# Patient Record
Sex: FEMALE | Race: WHITE | NOT HISPANIC OR LATINO | Employment: UNEMPLOYED | URBAN - METROPOLITAN AREA
[De-identification: names, ages, dates, MRNs, and addresses within clinical notes are randomized per-mention and may not be internally consistent; named-entity substitution may affect disease eponyms.]

---

## 2019-11-05 LAB
ABS NEUT (OLG): 6.3 X10(3)/MCL (ref 2.1–9.2)
BASOPHILS # BLD AUTO: 0 X10(3)/MCL (ref 0–0.2)
BASOPHILS NFR BLD AUTO: 1 %
BUN SERPL-MCNC: 13 MG/DL (ref 7–18)
CALCIUM SERPL-MCNC: 9.1 MG/DL (ref 8.5–10.1)
CHLORIDE SERPL-SCNC: 107 MMOL/L (ref 98–107)
CO2 SERPL-SCNC: 28 MMOL/L (ref 21–32)
CREAT SERPL-MCNC: 0.72 MG/DL (ref 0.55–1.02)
CREAT/UREA NIT SERPL: 18.1
EOSINOPHIL # BLD AUTO: 0 X10(3)/MCL (ref 0–0.9)
EOSINOPHIL NFR BLD AUTO: 0 %
ERYTHROCYTE [DISTWIDTH] IN BLOOD BY AUTOMATED COUNT: 13.9 % (ref 11.5–17)
GLUCOSE SERPL-MCNC: 78 MG/DL (ref 74–106)
GROUP & RH: NORMAL
HCT VFR BLD AUTO: 41.6 % (ref 37–47)
HGB BLD-MCNC: 12.9 GM/DL (ref 12–16)
LYMPHOCYTES # BLD AUTO: 1.9 X10(3)/MCL (ref 0.6–4.6)
LYMPHOCYTES NFR BLD AUTO: 21 %
MCH RBC QN AUTO: 26.7 PG (ref 27–31)
MCHC RBC AUTO-ENTMCNC: 31 GM/DL (ref 33–36)
MCV RBC AUTO: 86.1 FL (ref 80–94)
MONOCYTES # BLD AUTO: 0.5 X10(3)/MCL (ref 0.1–1.3)
MONOCYTES NFR BLD AUTO: 6 %
NEUTROPHILS # BLD AUTO: 6.3 X10(3)/MCL (ref 2.1–9.2)
NEUTROPHILS NFR BLD AUTO: 71 %
PLATELET # BLD AUTO: 292 X10(3)/MCL (ref 130–400)
PMV BLD AUTO: 11 FL (ref 9.4–12.4)
POTASSIUM SERPL-SCNC: 4.5 MMOL/L (ref 3.5–5.1)
RBC # BLD AUTO: 4.83 X10(6)/MCL (ref 4.2–5.4)
SODIUM SERPL-SCNC: 139 MMOL/L (ref 136–145)
WBC # SPEC AUTO: 8.8 X10(3)/MCL (ref 4.5–11.5)

## 2019-11-11 ENCOUNTER — HISTORICAL (OUTPATIENT)
Dept: SURGERY | Facility: HOSPITAL | Age: 24
End: 2019-11-11

## 2022-04-03 ENCOUNTER — HISTORICAL (OUTPATIENT)
Dept: ADMINISTRATIVE | Facility: HOSPITAL | Age: 27
End: 2022-04-03

## 2022-07-18 ENCOUNTER — CLINICAL SUPPORT (OUTPATIENT)
Dept: OTHER | Facility: CLINIC | Age: 27
End: 2022-07-18
Payer: MEDICAID

## 2022-07-18 DIAGNOSIS — Z00.8 ENCOUNTER FOR OTHER GENERAL EXAMINATION: ICD-10-CM

## 2022-07-19 VITALS
SYSTOLIC BLOOD PRESSURE: 138 MMHG | HEIGHT: 62 IN | DIASTOLIC BLOOD PRESSURE: 75 MMHG | BODY MASS INDEX: 39.01 KG/M2 | WEIGHT: 212 LBS

## 2022-07-19 LAB
HDLC SERPL-MCNC: 28 MG/DL
POC CHOLESTEROL, LDL (DOCK): 111.78 MG/DL
POC CHOLESTEROL, TOTAL: 151 MG/DL
POC GLUCOSE, FASTING: 81 MG/DL (ref 60–110)
TRIGL SERPL-MCNC: 54 MG/DL

## 2022-11-29 NOTE — OP NOTE
DATE OF SURGERY:    11/11/2019    SURGEON:  Cachorro Valencia MD    PROCEDURE:  Laparoscopic tubal cauterization, intrauterine device removal.    ANESTHESIA:  General.    PREOPERATIVE DIAGNOSIS:  Undesired fertility, desires intrauterine device removal.    POSTOPERATIVE DIAGNOSIS:  Undesired fertility, desires intrauterine device removal.    PROCEDURE IN DETAIL:  After appropriate consents were signed and verified, the patient was taken to the operating room.  She was placed on the OR table in supine position.  General anesthesia was administered and maintained throughout the procedure.  No antibiotics were given.  She was converted to a dorsal lithotomy and Trendelenburg position.  The perineum, vagina, and cervix were prepped as was the abdomen and she was draped in the usual sterile manner.  The bladder was drained of clear urine.  A weighted speculum was placed in the posterior vagina.  A single-tooth tenaculum was placed on the anterior cervix.  IUD strings were noted protruding from the cervix.  These strings were grasped with a ring forceps and a Mirena IUD was removed intact without difficulty.       Next, an acorn cannula was inserted in the cervix and attached to the tenaculum to help manipulate the uterus.  A 2 cm curvilinear incision was made in the skin with a scalpel inferior to the umbilicus.  Through this incision a Veress needle was placed.  Entry into the abdominal cavity was confirmed with a negative hanging drop test.  2.5 L of CO2 gas were instilled into the abdominal cavity.  The Veress needle was removed.  A 10-12 mm trocar and sleeve was inserted through the incision, the sleeve left in place, the trocar removed, and an operative laparoscope was introduced.  An adhesion was noted between omentum and the anterior abdominal wall in the midline near the incision, but there was no evidence of injury to underlying blood vessels, organs, or tissues.  A [employer] forceps was then introduced  through the operative laparoscope and the right fallopian tube was cauterized in a 1 cm contiguous space near the junction of the proximal and middle 1/3.  Good obliteration of normal tubal architecture was noted.  This procedure was duplicated for the left tube.  There was no evidence of injury to surrounding blood vessels, organs, or tissues.  The remainder of the pelvis was unremarkable.       The procedure was concluded.  The instrument was removed from the trocar sleeve.  The CO2 gas was allowed to escape through the sleeve and the sleeve was removed.  The skin was closed with 4-0 Vicryl in a running subcuticular stitch and a pressure bandage was applied.  Instruments were removed from the vagina and minimal bleeding was noted.  Estimated blood loss was less than 5 cc.  Sponge, needle, and     instrument counts were correct.  The patient was reversed of anesthesia and taken to recovery awake and in stable condition.        ______________________________  MD KYLER Amin/RAJESH  DD:  11/11/2019  Time:  07:42AM  DT:  11/11/2019  Time:  07:57AM  Job #:  [id number]      details…

## 2023-04-19 ENCOUNTER — LAB VISIT (OUTPATIENT)
Dept: LAB | Facility: HOSPITAL | Age: 28
End: 2023-04-19
Attending: OBSTETRICS & GYNECOLOGY
Payer: MEDICAID

## 2023-04-19 DIAGNOSIS — Z34.80 PRENATAL CARE, SUBSEQUENT PREGNANCY: Primary | ICD-10-CM

## 2023-04-19 LAB — B-HCG FREE SERPL-ACNC: ABNORMAL MIU/ML

## 2023-04-19 PROCEDURE — 84702 CHORIONIC GONADOTROPIN TEST: CPT

## 2023-04-19 PROCEDURE — 36415 COLL VENOUS BLD VENIPUNCTURE: CPT

## 2023-04-24 ENCOUNTER — LAB VISIT (OUTPATIENT)
Dept: LAB | Facility: HOSPITAL | Age: 28
End: 2023-04-24
Attending: OBSTETRICS & GYNECOLOGY
Payer: MEDICAID

## 2023-04-24 DIAGNOSIS — Z34.00 SUPERVISION OF NORMAL FIRST PREGNANCY: Primary | ICD-10-CM

## 2023-04-24 LAB — B-HCG FREE SERPL-ACNC: ABNORMAL MIU/ML

## 2023-04-24 PROCEDURE — 84702 CHORIONIC GONADOTROPIN TEST: CPT

## 2023-04-24 PROCEDURE — 36415 COLL VENOUS BLD VENIPUNCTURE: CPT

## 2024-05-08 LAB
PAP RECOMMENDATION EXT: NORMAL
PAP SMEAR: NORMAL

## 2024-12-23 ENCOUNTER — LAB VISIT (OUTPATIENT)
Dept: LAB | Facility: HOSPITAL | Age: 29
End: 2024-12-23
Attending: FAMILY MEDICINE
Payer: MEDICAID

## 2024-12-23 ENCOUNTER — OFFICE VISIT (OUTPATIENT)
Dept: FAMILY MEDICINE | Facility: CLINIC | Age: 29
End: 2024-12-23
Payer: MEDICAID

## 2024-12-23 VITALS
WEIGHT: 211.19 LBS | HEART RATE: 79 BPM | HEIGHT: 62 IN | TEMPERATURE: 97 F | OXYGEN SATURATION: 99 % | RESPIRATION RATE: 18 BRPM | SYSTOLIC BLOOD PRESSURE: 108 MMHG | BODY MASS INDEX: 38.86 KG/M2 | DIASTOLIC BLOOD PRESSURE: 64 MMHG

## 2024-12-23 DIAGNOSIS — R42 DIZZINESS: ICD-10-CM

## 2024-12-23 DIAGNOSIS — E61.1 IRON DEFICIENCY: Primary | ICD-10-CM

## 2024-12-23 DIAGNOSIS — F41.1 GAD (GENERALIZED ANXIETY DISORDER): ICD-10-CM

## 2024-12-23 DIAGNOSIS — E61.1 IRON DEFICIENCY: ICD-10-CM

## 2024-12-23 LAB
ALBUMIN SERPL-MCNC: 4 G/DL (ref 3.5–5)
ALBUMIN/GLOB SERPL: 1.4 RATIO (ref 1.1–2)
ALP SERPL-CCNC: 82 UNIT/L (ref 40–150)
ALT SERPL-CCNC: 11 UNIT/L (ref 0–55)
ANION GAP SERPL CALC-SCNC: 9 MEQ/L
AST SERPL-CCNC: 12 UNIT/L (ref 5–34)
BASOPHILS # BLD AUTO: 0.06 X10(3)/MCL
BASOPHILS NFR BLD AUTO: 0.9 %
BILIRUB SERPL-MCNC: 0.4 MG/DL
BUN SERPL-MCNC: 14 MG/DL (ref 7–18.7)
CALCIUM SERPL-MCNC: 9.1 MG/DL (ref 8.4–10.2)
CHLORIDE SERPL-SCNC: 109 MMOL/L (ref 98–107)
CO2 SERPL-SCNC: 24 MMOL/L (ref 22–29)
CREAT SERPL-MCNC: 0.71 MG/DL (ref 0.55–1.02)
CREAT/UREA NIT SERPL: 20
EOSINOPHIL # BLD AUTO: 0.07 X10(3)/MCL (ref 0–0.9)
EOSINOPHIL NFR BLD AUTO: 1 %
ERYTHROCYTE [DISTWIDTH] IN BLOOD BY AUTOMATED COUNT: 13.2 % (ref 11.5–17)
FERRITIN SERPL-MCNC: 26.88 NG/ML (ref 4.63–204)
GFR SERPLBLD CREATININE-BSD FMLA CKD-EPI: >60 ML/MIN/1.73/M2
GLOBULIN SER-MCNC: 2.9 GM/DL (ref 2.4–3.5)
GLUCOSE SERPL-MCNC: 81 MG/DL (ref 74–100)
HCT VFR BLD AUTO: 39.8 % (ref 37–47)
HGB BLD-MCNC: 12.6 G/DL (ref 12–16)
IMM GRANULOCYTES # BLD AUTO: 0.01 X10(3)/MCL (ref 0–0.04)
IMM GRANULOCYTES NFR BLD AUTO: 0.1 %
IRON SATN MFR SERPL: 14 % (ref 20–50)
IRON SERPL-MCNC: 48 UG/DL (ref 50–170)
LYMPHOCYTES # BLD AUTO: 2.05 X10(3)/MCL (ref 0.6–4.6)
LYMPHOCYTES NFR BLD AUTO: 30 %
MCH RBC QN AUTO: 27.5 PG (ref 27–31)
MCHC RBC AUTO-ENTMCNC: 31.7 G/DL (ref 33–36)
MCV RBC AUTO: 86.9 FL (ref 80–94)
MONOCYTES # BLD AUTO: 0.48 X10(3)/MCL (ref 0.1–1.3)
MONOCYTES NFR BLD AUTO: 7 %
NEUTROPHILS # BLD AUTO: 4.17 X10(3)/MCL (ref 2.1–9.2)
NEUTROPHILS NFR BLD AUTO: 61 %
NRBC BLD AUTO-RTO: 0 %
PLATELET # BLD AUTO: 304 X10(3)/MCL (ref 130–400)
PMV BLD AUTO: 10.7 FL (ref 7.4–10.4)
POTASSIUM SERPL-SCNC: 3.9 MMOL/L (ref 3.5–5.1)
PROT SERPL-MCNC: 6.9 GM/DL (ref 6.4–8.3)
RBC # BLD AUTO: 4.58 X10(6)/MCL (ref 4.2–5.4)
RET# (OHS): 0.08 X10E6/UL (ref 0.02–0.08)
RETICULOCYTE COUNT AUTOMATED (OLG): 1.71 % (ref 1.1–2.1)
SODIUM SERPL-SCNC: 142 MMOL/L (ref 136–145)
TIBC SERPL-MCNC: 301 UG/DL (ref 70–310)
TIBC SERPL-MCNC: 349 UG/DL (ref 250–450)
TRANSFERRIN SERPL-MCNC: 319 MG/DL (ref 180–382)
TSH SERPL-ACNC: 0.96 UIU/ML (ref 0.35–4.94)
WBC # BLD AUTO: 6.84 X10(3)/MCL (ref 4.5–11.5)

## 2024-12-23 PROCEDURE — 85045 AUTOMATED RETICULOCYTE COUNT: CPT

## 2024-12-23 PROCEDURE — 36415 COLL VENOUS BLD VENIPUNCTURE: CPT

## 2024-12-23 PROCEDURE — 85025 COMPLETE CBC W/AUTO DIFF WBC: CPT

## 2024-12-23 PROCEDURE — 80053 COMPREHEN METABOLIC PANEL: CPT

## 2024-12-23 PROCEDURE — 84443 ASSAY THYROID STIM HORMONE: CPT

## 2024-12-23 PROCEDURE — 82728 ASSAY OF FERRITIN: CPT

## 2024-12-23 PROCEDURE — 83550 IRON BINDING TEST: CPT

## 2024-12-23 RX ORDER — SERTRALINE HYDROCHLORIDE 25 MG/1
25 TABLET, FILM COATED ORAL DAILY
Qty: 30 TABLET | Refills: 11 | Status: SHIPPED | OUTPATIENT
Start: 2024-12-23 | End: 2025-12-23

## 2024-12-23 RX ORDER — HYDROXYZINE HYDROCHLORIDE 25 MG/1
25 TABLET, FILM COATED ORAL 4 TIMES DAILY PRN
Qty: 120 TABLET | Refills: 0 | Status: SHIPPED | OUTPATIENT
Start: 2024-12-23 | End: 2025-01-22

## 2024-12-23 NOTE — PROGRESS NOTES
"   Patient ID: [id number]     Chief Complaint: Establish Care, Dizziness (" Always had issues with Iron/vit D. But I take OTC" ), and Depression ("Worse since having baby 8 months ago"  On no meds for depression. )    HPI:     Steffanie Agee is a 29 y.o. female here today for Establish Care, Dizziness (" Always had issues with Iron/vit D. But I take OTC" ), and Depression ("Worse since having baby 8 months ago"  On no meds for depression. )    No past medical history on file.     Past Surgical History:   Procedure Laterality Date    ADENOIDECTOMY      BREAST SURGERY      Cyst removed in right breast     SECTION  18    TONSILLECTOMY      TUBAL LIGATION  2019    Had it untied in        Review of patient's allergies indicates:   Allergen Reactions    Ultram [tramadol] Hallucinations       No outpatient medications have been marked as taking for the 24 encounter (Office Visit) with Jensen Platt DO.       Social History     Socioeconomic History    Marital status:    Tobacco Use    Smoking status: Never    Smokeless tobacco: Never   Substance and Sexual Activity    Alcohol use: Never    Drug use: Never    Sexual activity: Yes     Partners: Female     Birth control/protection: None     Comment: 1 partner for 11 years     Social Drivers of Health     Financial Resource Strain: Low Risk  (2024)    Overall Financial Resource Strain (CARDIA)     Difficulty of Paying Living Expenses: Not very hard   Food Insecurity: Food Insecurity Present (2024)    Hunger Vital Sign     Worried About Running Out of Food in the Last Year: Sometimes true     Ran Out of Food in the Last Year: Sometimes true   Transportation Needs: No Transportation Needs (3/20/2024)    Received from [facility], [facility]    PRAPARE - Transportation     Lack of " "Transportation (Medical): No     Lack of Transportation (Non-Medical): No   Physical Activity: Insufficiently Active (12/16/2024)    Exercise Vital Sign     Days of Exercise per Week: 3 days     Minutes of Exercise per Session: 20 min   Stress: Stress Concern Present (12/16/2024)    [employer] [place] [employer] - Occupational Stress Questionnaire     Feeling of Stress : Very much   Housing Stability: High Risk (12/16/2024)    Housing Stability Vital Sign     Unable to Pay for Housing in the Last Year: Yes        Family History   Problem Relation Name Age of Onset    Alcohol abuse Mother Lisa montgomery     Depression Mother Lisa montgomery     Drug abuse Mother Lisa montgomery     Early death Mother Lisa montgomery     Alcohol abuse Father Brayan Agee     Drug abuse Father Brayan Agee     Early death Father Brayan Agee     Arthritis Maternal Grandmother Alysa     Cancer Maternal Grandmother Alysa     Depression Maternal Grandmother Alysa     Diabetes Maternal Grandmother Alysa     Cancer Paternal Grandmother Geni Agee     Depression Paternal Grandmother Geni Agee     Birth defects Son Tiosomy 13     Early death Son Tiosomy 13         Patient Care Team:  Jensen Platt DO as PCP - General  Jocelyn Cordoba MD (Obstetrics and Gynecology)     Subjective:     Review of Systems   Constitutional:  Negative for chills and fever.   Respiratory:  Negative for shortness of breath.    Cardiovascular:  Negative for chest pain.   Gastrointestinal:  Negative for constipation and diarrhea.   Neurological:  Positive for dizziness. Negative for headaches.   Psychiatric/Behavioral:  Positive for depression. The patient is nervous/anxious. The patient does not have insomnia.        See HPI for details  All Other ROS: Negative except as stated in HPI.       Objective:     /64 (BP Location: Left arm, Patient Position: Sitting)   Pulse 79   Temp 97.1 °F (36.2 °C)   Resp 18   Ht 5' 2" (1.575 " m)   Wt 95.8 kg (211 lb 3.2 oz)   LMP 12/17/2024 (Exact Date)   SpO2 99%   BMI 38.63 kg/m²     Physical Exam  Vitals reviewed.   Constitutional:       General: She is not in acute distress.     Appearance: Normal appearance. She is obese.   Cardiovascular:      Rate and Rhythm: Normal rate and regular rhythm.      Heart sounds: No murmur heard.     No friction rub. No gallop.   Pulmonary:      Effort: No respiratory distress.      Breath sounds: No wheezing, rhonchi or rales.   Musculoskeletal:         General: No swelling, tenderness or deformity.      Right lower leg: No edema.      Left lower leg: No edema.   Skin:     General: Skin is warm and dry.      Findings: No lesion or rash.   Neurological:      General: No focal deficit present.      Mental Status: She is alert.   Psychiatric:         Mood and Affect: Mood normal.         Assessment/Plan:     1. Iron deficiency  -     Iron and TIBC; Future; Expected date: 12/23/2024  -     Ferritin; Future; Expected date: 12/23/2024  -     Reticulocytes; Future; Expected date: 12/23/2024  -     CBC Auto Differential; Future; Expected date: 12/23/2024  -     Comprehensive Metabolic Panel; Future; Expected date: 12/23/2024    2. Dizziness  -     Comprehensive Metabolic Panel; Future; Expected date: 12/23/2024    3. Postpartum depression  -     sertraline (ZOLOFT) 25 MG tablet; Take 1 tablet (25 mg total) by mouth once daily.  Dispense: 30 tablet; Refill: 11    4. CAPRI (generalized anxiety disorder)  -     sertraline (ZOLOFT) 25 MG tablet; Take 1 tablet (25 mg total) by mouth once daily.  Dispense: 30 tablet; Refill: 11  -     hydrOXYzine HCL (ATARAX) 25 MG tablet; Take 1 tablet (25 mg total) by mouth 4 (four) times daily as needed for Anxiety.  Dispense: 120 tablet; Refill: 0       Follow up:     Follow up in about 6 weeks (around 2/3/2025) for Follow up anxiety and depression with Dr. TONA Carreon In addition to their scheduled follow up, the patient has also been instructed to  follow up on as needed basis.

## 2024-12-30 ENCOUNTER — TELEPHONE (OUTPATIENT)
Dept: FAMILY MEDICINE | Facility: CLINIC | Age: 29
End: 2024-12-30
Payer: MEDICAID

## 2024-12-30 NOTE — TELEPHONE ENCOUNTER
----- Message from Jeyson Rodriguez NP sent at 12/30/2024  2:39 PM CST -----  Iron level is low. Is she taking an iron supplement?     All other labs within normal ranges.     Has upcoming appt.

## 2025-01-27 ENCOUNTER — OFFICE VISIT (OUTPATIENT)
Dept: FAMILY MEDICINE | Facility: CLINIC | Age: 30
End: 2025-01-27
Payer: MEDICAID

## 2025-01-27 VITALS
HEIGHT: 62 IN | BODY MASS INDEX: 38.09 KG/M2 | DIASTOLIC BLOOD PRESSURE: 68 MMHG | HEART RATE: 63 BPM | SYSTOLIC BLOOD PRESSURE: 110 MMHG | WEIGHT: 207 LBS | OXYGEN SATURATION: 98 % | TEMPERATURE: 97 F | RESPIRATION RATE: 16 BRPM

## 2025-01-27 DIAGNOSIS — Z00.00 ROUTINE GENERAL MEDICAL EXAMINATION AT A HEALTH CARE FACILITY: ICD-10-CM

## 2025-01-27 DIAGNOSIS — F41.1 GAD (GENERALIZED ANXIETY DISORDER): Primary | ICD-10-CM

## 2025-01-27 DIAGNOSIS — E61.1 IRON DEFICIENCY: ICD-10-CM

## 2025-01-27 PROCEDURE — 3074F SYST BP LT 130 MM HG: CPT | Mod: CPTII,,, | Performed by: FAMILY MEDICINE

## 2025-01-27 PROCEDURE — 1160F RVW MEDS BY RX/DR IN RCRD: CPT | Mod: CPTII,,, | Performed by: FAMILY MEDICINE

## 2025-01-27 PROCEDURE — 1159F MED LIST DOCD IN RCRD: CPT | Mod: CPTII,,, | Performed by: FAMILY MEDICINE

## 2025-01-27 PROCEDURE — 3078F DIAST BP <80 MM HG: CPT | Mod: CPTII,,, | Performed by: FAMILY MEDICINE

## 2025-01-27 PROCEDURE — 99213 OFFICE O/P EST LOW 20 MIN: CPT | Mod: ,,, | Performed by: FAMILY MEDICINE

## 2025-01-27 PROCEDURE — 3008F BODY MASS INDEX DOCD: CPT | Mod: CPTII,,, | Performed by: FAMILY MEDICINE

## 2025-01-27 NOTE — PROGRESS NOTES
Patient ID: [id number]     Chief Complaint: Follow-up, Depression (Gotten better ), and Anxiety (Feeling better. Has noticed a big difference. )    HPI:     Steffanie Agee is a 29 y.o. female here today for Follow-up, Depression (Gotten better ), and Anxiety (Feeling better. Has noticed a big difference. ).       No past medical history on file.     Past Surgical History:   Procedure Laterality Date    ADENOIDECTOMY      BREAST SURGERY      Cyst removed in right breast     SECTION  18    TONSILLECTOMY      TUBAL LIGATION  2019    Had it untied in        Review of patient's allergies indicates:   Allergen Reactions    Ultram [tramadol] Hallucinations       Outpatient Medications Marked as Taking for the 25 encounter (Office Visit) with Jensen Platt, DO   Medication Sig Dispense Refill    sertraline (ZOLOFT) 25 MG tablet Take 1 tablet (25 mg total) by mouth once daily. 30 tablet 11       Social History     Socioeconomic History    Marital status:    Tobacco Use    Smoking status: Never    Smokeless tobacco: Never   Substance and Sexual Activity    Alcohol use: Never    Drug use: Never    Sexual activity: Yes     Partners: Female     Birth control/protection: None     Comment: 1 partner for 11 years     Social Drivers of Health     Financial Resource Strain: Low Risk  (2024)    Overall Financial Resource Strain (CARDIA)     Difficulty of Paying Living Expenses: Not very hard   Food Insecurity: Food Insecurity Present (2024)    Hunger Vital Sign     Worried About Running Out of Food in the Last Year: Sometimes true     Ran Out of Food in the Last Year: Sometimes true   Transportation Needs: No Transportation Needs (3/20/2024)    Received from [facility], [facility]    PRAPARE - Transportation     Lack of Transportation  "(Medical): No     Lack of Transportation (Non-Medical): No   Physical Activity: Insufficiently Active (12/16/2024)    Exercise Vital Sign     Days of Exercise per Week: 3 days     Minutes of Exercise per Session: 20 min   Stress: Stress Concern Present (12/16/2024)    [employer] [place] [employer] - Occupational Stress Questionnaire     Feeling of Stress : Very much   Housing Stability: High Risk (12/16/2024)    Housing Stability Vital Sign     Unable to Pay for Housing in the Last Year: Yes        Family History   Problem Relation Name Age of Onset    Alcohol abuse Mother Lisaowen montgomery     Depression Mother Lisa montgomery     Drug abuse Mother Lisa montgomery     Early death Mother Lisa montgomery     Alcohol abuse Father rBayan Agee     Drug abuse Father Brayan Agee     Early death Father Brayan Agee     Arthritis Maternal Grandmother Alysa     Cancer Maternal Grandmother Alysa     Depression Maternal Grandmother Alysa     Diabetes Maternal Grandmother Alysa     Cancer Paternal Grandmother Geni Agee     Depression Paternal Grandmother Geni Agee     Birth defects Son Tiosomy 13     Early death Son Tiosomy 13         Patient Care Team:  Jensen Platt DO as PCP - General  Jocelyn Cordoba MD (Obstetrics and Gynecology)     Subjective:     ROS    See HPI for details  All Other ROS: Negative except as stated in HPI.       Objective:     /68 (BP Location: Left arm, Patient Position: Sitting)   Pulse 63   Temp 96.9 °F (36.1 °C) (Temporal)   Resp 16   Ht 5' 2" (1.575 m)   Wt 93.9 kg (207 lb)   LMP 01/20/2025 (Exact Date)   SpO2 98%   BMI 37.86 kg/m²     Physical Exam  Vitals reviewed.   Constitutional:       General: She is not in acute distress.     Appearance: Normal appearance. She is obese.   Cardiovascular:      Rate and Rhythm: Normal rate and regular rhythm.      Heart sounds: No murmur heard.     No friction rub. No gallop.   Pulmonary:      Effort: No " respiratory distress.      Breath sounds: No wheezing, rhonchi or rales.   Musculoskeletal:         General: No swelling, tenderness or deformity.      Right lower leg: No edema.      Left lower leg: No edema.   Skin:     General: Skin is warm and dry.      Findings: No lesion or rash.   Neurological:      General: No focal deficit present.      Mental Status: She is alert.   Psychiatric:         Mood and Affect: Mood normal.         Assessment/Plan:     1. CAPRI (generalized anxiety disorder)  -continue zoloft 25mg daily  2. Postpartum depression  -continue zoloft 25mg daily      Follow up:     Follow up in about 6 months (around 7/27/2025) for Wellness with labs. In addition to their scheduled follow up, the patient has also been instructed to follow up on as needed basis.

## 2025-03-05 ENCOUNTER — PATIENT MESSAGE (OUTPATIENT)
Dept: FAMILY MEDICINE | Facility: CLINIC | Age: 30
End: 2025-03-05

## 2025-03-05 ENCOUNTER — LAB VISIT (OUTPATIENT)
Dept: LAB | Facility: HOSPITAL | Age: 30
End: 2025-03-05
Payer: MEDICAID

## 2025-03-05 ENCOUNTER — OFFICE VISIT (OUTPATIENT)
Dept: FAMILY MEDICINE | Facility: CLINIC | Age: 30
End: 2025-03-05
Payer: MEDICAID

## 2025-03-05 VITALS
RESPIRATION RATE: 20 BRPM | BODY MASS INDEX: 37.51 KG/M2 | HEIGHT: 62 IN | SYSTOLIC BLOOD PRESSURE: 121 MMHG | DIASTOLIC BLOOD PRESSURE: 81 MMHG | WEIGHT: 203.81 LBS | TEMPERATURE: 98 F | OXYGEN SATURATION: 98 % | HEART RATE: 82 BPM

## 2025-03-05 DIAGNOSIS — F41.1 GAD (GENERALIZED ANXIETY DISORDER): ICD-10-CM

## 2025-03-05 DIAGNOSIS — R23.3 SPONTANEOUS ECCHYMOSES: ICD-10-CM

## 2025-03-05 DIAGNOSIS — F32.4 MAJOR DEPRESSIVE DISORDER WITH SINGLE EPISODE, IN PARTIAL REMISSION: ICD-10-CM

## 2025-03-05 DIAGNOSIS — R23.3 SPONTANEOUS ECCHYMOSES: Primary | ICD-10-CM

## 2025-03-05 DIAGNOSIS — M79.10 MYALGIA: ICD-10-CM

## 2025-03-05 LAB
ALBUMIN SERPL-MCNC: 4.1 G/DL (ref 3.5–5)
ALBUMIN/GLOB SERPL: 1.3 RATIO (ref 1.1–2)
ALP SERPL-CCNC: 69 UNIT/L (ref 40–150)
ALT SERPL-CCNC: 12 UNIT/L (ref 0–55)
ANION GAP SERPL CALC-SCNC: 6 MEQ/L
APTT PPP: <23.2 SECONDS (ref 24.5–32.8)
AST SERPL-CCNC: 11 UNIT/L (ref 5–34)
BASOPHILS # BLD AUTO: 0.08 X10(3)/MCL
BASOPHILS NFR BLD AUTO: 1 %
BILIRUB SERPL-MCNC: 0.4 MG/DL
BUN SERPL-MCNC: 13 MG/DL (ref 7–18.7)
CALCIUM SERPL-MCNC: 9.3 MG/DL (ref 8.4–10.2)
CHLORIDE SERPL-SCNC: 110 MMOL/L (ref 98–107)
CO2 SERPL-SCNC: 27 MMOL/L (ref 22–29)
CREAT SERPL-MCNC: 0.75 MG/DL (ref 0.55–1.02)
CREAT/UREA NIT SERPL: 17
EOSINOPHIL # BLD AUTO: 0.07 X10(3)/MCL (ref 0–0.9)
EOSINOPHIL NFR BLD AUTO: 0.8 %
ERYTHROCYTE [DISTWIDTH] IN BLOOD BY AUTOMATED COUNT: 13.8 % (ref 11.5–17)
GFR SERPLBLD CREATININE-BSD FMLA CKD-EPI: >60 ML/MIN/1.73/M2
GLOBULIN SER-MCNC: 3.2 GM/DL (ref 2.4–3.5)
GLUCOSE SERPL-MCNC: 92 MG/DL (ref 74–100)
HCT VFR BLD AUTO: 40.1 % (ref 37–47)
HGB BLD-MCNC: 13.1 G/DL (ref 12–16)
IMM GRANULOCYTES # BLD AUTO: 0.02 X10(3)/MCL (ref 0–0.04)
IMM GRANULOCYTES NFR BLD AUTO: 0.2 %
INR PPP: 0.9
LYMPHOCYTES # BLD AUTO: 1.91 X10(3)/MCL (ref 0.6–4.6)
LYMPHOCYTES NFR BLD AUTO: 22.7 %
MCH RBC QN AUTO: 28.2 PG (ref 27–31)
MCHC RBC AUTO-ENTMCNC: 32.7 G/DL (ref 33–36)
MCV RBC AUTO: 86.4 FL (ref 80–94)
MONOCYTES # BLD AUTO: 0.55 X10(3)/MCL (ref 0.1–1.3)
MONOCYTES NFR BLD AUTO: 6.5 %
NEUTROPHILS # BLD AUTO: 5.77 X10(3)/MCL (ref 2.1–9.2)
NEUTROPHILS NFR BLD AUTO: 68.8 %
NRBC BLD AUTO-RTO: 0 %
PLATELET # BLD AUTO: 275 X10(3)/MCL (ref 130–400)
PMV BLD AUTO: 10.3 FL (ref 7.4–10.4)
POTASSIUM SERPL-SCNC: 4.2 MMOL/L (ref 3.5–5.1)
PROT SERPL-MCNC: 7.3 GM/DL (ref 6.4–8.3)
PROTHROMBIN TIME: 9.6 SECONDS (ref 9.3–11.4)
RBC # BLD AUTO: 4.64 X10(6)/MCL (ref 4.2–5.4)
SODIUM SERPL-SCNC: 143 MMOL/L (ref 136–145)
WBC # BLD AUTO: 8.4 X10(3)/MCL (ref 4.5–11.5)

## 2025-03-05 PROCEDURE — 1159F MED LIST DOCD IN RCRD: CPT | Mod: CPTII,,,

## 2025-03-05 PROCEDURE — 3074F SYST BP LT 130 MM HG: CPT | Mod: CPTII,,,

## 2025-03-05 PROCEDURE — 80053 COMPREHEN METABOLIC PANEL: CPT

## 2025-03-05 PROCEDURE — 3008F BODY MASS INDEX DOCD: CPT | Mod: CPTII,,,

## 2025-03-05 PROCEDURE — 85025 COMPLETE CBC W/AUTO DIFF WBC: CPT

## 2025-03-05 PROCEDURE — 1160F RVW MEDS BY RX/DR IN RCRD: CPT | Mod: CPTII,,,

## 2025-03-05 PROCEDURE — 3079F DIAST BP 80-89 MM HG: CPT | Mod: CPTII,,,

## 2025-03-05 PROCEDURE — 99214 OFFICE O/P EST MOD 30 MIN: CPT | Mod: ,,,

## 2025-03-05 PROCEDURE — 36415 COLL VENOUS BLD VENIPUNCTURE: CPT

## 2025-03-05 PROCEDURE — 85610 PROTHROMBIN TIME: CPT

## 2025-03-05 PROCEDURE — 85730 THROMBOPLASTIN TIME PARTIAL: CPT

## 2025-03-05 RX ORDER — FERROUS SULFATE 325(65) MG
325 TABLET ORAL
COMMUNITY

## 2025-03-05 NOTE — ASSESSMENT & PLAN NOTE
Evaluated the patient's report of spontaneous bruises on legs present for over 1 week.  Confirmed the patient denies falling or hitting herself on anything.  Planned to check platelet count and coagulation factors.  Ordered CBC (Complete Blood Count), APTT (Activated Partial Thromboplastin Time), PT (Prothrombin Time) and INR (International Normalized Ratio) tests.  Advised the patient to reduce consumption of foods high in vitamin K.  Explained that certain foods elevated in vitamin K (beef, pork, dark green vegetables, soybeans) can affect PT and INR results.  Steffanie to cut back on these foods before labs.  Discussed the importance of avoiding vitamin K supplements prior to labs.  Steffanie to stop taking any supplements containing vitamin K before labs.  Planned to check liver function as part of initial workup    Will consider further testing for specific coagulation disorders (e.g., Von Willebrand disease) if initial results are abnormal

## 2025-03-05 NOTE — ASSESSMENT & PLAN NOTE
Continued Zoloft 25 mg daily for anxiety and depression.    Exercise daily. Get sunlight daily.  Practice positive phrases and repeat throughout the day, along with yoga and relaxation techniques.  Establish good social support, make changes to reduce stress.  Reports any symptoms of suicidal/homicidal ideations or self harm immediately, if clinic is closed go to nearest emergency room.

## 2025-03-05 NOTE — PROGRESS NOTES
"  Patient ID: [id number]     Chief Complaint: Bleeding/Bruising (Bruising easily)      HPI:     Steffanie Agee is a 29 year old female who presents today with unexplained bruising.    PRESENT ILLNESS:  She reports bruising present for over a week, with bruises on the front beginning to fade. The bruises are very tender to touch. She denies any history of trauma, falls, or known precipitating events. She also reports generalized body aches, describing feeling "muscle sore" throughout her body without any changes in routine or new activities.    MENSTRUAL HISTORY:  Last menstrual period was on the 16th of last month. She denies heavy menstrual flow during her most recent cycle.    MEDICATIONS:  She takes Zoloft 25 mg daily for anxiety and depression, iron 325 mg daily, and a prenatal vitamin.    MENTAL HEALTH:  She denies current suicidal or homicidal ideation.    FAMILY HISTORY:  Family history includes fibromyalgia on paternal side, cervical cancer in one grandmother, breast cancer in other grandmother, and maternal cirrhosis secondary to alcoholism. Both parents are . She has a family history of Leukemia and Lupus.        Past Medical History:   Diagnosis Date    Depression         Past Surgical History:   Procedure Laterality Date    ADENOIDECTOMY      BREAST SURGERY      Cyst removed in right breast     SECTION  20    TONSILLECTOMY      TUBAL LIGATION  2019    Had it untied in         Social History     Socioeconomic History    Marital status:    Tobacco Use    Smoking status: Never    Smokeless tobacco: Never   Substance and Sexual Activity    Alcohol use: Never    Drug use: Never    Sexual activity: Yes     Partners: Female     Birth control/protection: None     Comment: 1 partner for 11 years     Social Drivers of Health     Financial Resource Strain: Low Risk  (2024)    Overall Financial Resource Strain (CARDIA)     Difficulty of Paying Living Expenses: Not very hard " "  Food Insecurity: Food Insecurity Present (12/16/2024)    Hunger Vital Sign     Worried About Running Out of Food in the Last Year: Sometimes true     Ran Out of Food in the Last Year: Sometimes true   Transportation Needs: No Transportation Needs (3/20/2024)    Received from [facility] and Its Subsidiaries and Affiliates    PRAPARE - Transportation     Lack of Transportation (Medical): No     Lack of Transportation (Non-Medical): No   Physical Activity: Insufficiently Active (12/16/2024)    Exercise Vital Sign     Days of Exercise per Week: 3 days     Minutes of Exercise per Session: 20 min   Stress: Stress Concern Present (12/16/2024)    [employer] - Occupational Stress Questionnaire     Feeling of Stress : Very much   Housing Stability: High Risk (12/16/2024)    Housing Stability Vital Sign     Unable to Pay for Housing in the Last Year: Yes        Current Outpatient Medications   Medication Instructions    ferrous sulfate (FEOSOL) 325 mg, With breakfast    prenatal vit no.124/iron/folic (PRENATAL VITAMIN ORAL) Take by mouth.    sertraline (ZOLOFT) 25 mg, Oral, Daily       Review of patient's allergies indicates:   Allergen Reactions    Ultram [tramadol] Hallucinations        Patient Care Team:  Jensen Paltt DO as PCP - General  Jocelyn Cordoba MD (Obstetrics and Gynecology)     Subjective:     Review of Systems    12 point review of systems conducted, negative except as stated in the history of present illness. See HPI for details.    Objective:     Visit Vitals  /81 (BP Location: Right arm, Patient Position: Sitting)   Pulse 82   Temp 97.6 °F (36.4 °C)   Resp 20   Ht 5' 2" (1.575 m)   Wt 92.4 kg (203 lb 12.8 oz)   LMP 02/16/2025   SpO2 98%   BMI 37.28 kg/m²       Physical Exam  Vitals and nursing note reviewed.   Constitutional:       General: She is not in acute distress.     Appearance: She is not ill-appearing.   HENT:     "  Head: Normocephalic and atraumatic.      Mouth/Throat:      Mouth: Mucous membranes are moist.      Pharynx: Oropharynx is clear.   Eyes:      General: No scleral icterus.     Extraocular Movements: Extraocular movements intact.      Conjunctiva/sclera: Conjunctivae normal.      Pupils: Pupils are equal, round, and reactive to light.   Neck:      Vascular: No carotid bruit.   Cardiovascular:      Rate and Rhythm: Normal rate and regular rhythm.      Heart sounds: No murmur heard.     No friction rub. No gallop.   Pulmonary:      Effort: Pulmonary effort is normal. No respiratory distress.      Breath sounds: Normal breath sounds. No wheezing, rhonchi or rales.   Abdominal:      General: Abdomen is flat. Bowel sounds are normal. There is no distension.      Palpations: Abdomen is soft. There is no mass.      Tenderness: There is no abdominal tenderness.   Musculoskeletal:         General: Normal range of motion.      Cervical back: Normal range of motion and neck supple.   Skin:     General: Skin is warm and dry.      Findings: Bruising and ecchymosis present.          Neurological:      General: No focal deficit present.      Mental Status: She is alert.   Psychiatric:         Mood and Affect: Mood normal.         Labs Reviewed:     Chemistry:  Lab Results   Component Value Date     03/05/2025    K 4.2 03/05/2025    BUN 13.0 03/05/2025    CREATININE 0.75 03/05/2025    EGFRNORACEVR >60 03/05/2025    GLUCOSE 92 03/05/2025    CALCIUM 9.3 03/05/2025    ALKPHOS 69 03/05/2025    LABPROT 9.6 03/05/2025    LABPROT 7.3 03/05/2025    ALBUMIN 4.1 03/05/2025    BILIDIR 0.4 04/03/2022    IBILI 0.80 04/03/2022    AST 11 03/05/2025    ALT 12 03/05/2025    TSH 0.959 12/23/2024     Assessment:       ICD-10-CM ICD-9-CM   1. Spontaneous ecchymoses  R23.3 782.7   2. Myalgia  M79.10 729.1   3. CAPRI (generalized anxiety disorder)  F41.1 300.02   4. Major depressive disorder with single episode, in partial remission  F32.4 296.25         Plan:     1. Spontaneous ecchymoses  Assessment & Plan:  Evaluated the patient's report of spontaneous bruises on legs present for over 1 week.  Confirmed the patient denies falling or hitting herself on anything.  Planned to check platelet count and coagulation factors.  Ordered CBC (Complete Blood Count), APTT (Activated Partial Thromboplastin Time), PT (Prothrombin Time) and INR (International Normalized Ratio) tests.  Advised the patient to reduce consumption of foods high in vitamin K.  Explained that certain foods elevated in vitamin K (beef, pork, dark green vegetables, soybeans) can affect PT and INR results.  Steffanie to cut back on these foods before labs.  Discussed the importance of avoiding vitamin K supplements prior to labs.  Steffanie to stop taking any supplements containing vitamin K before labs.  Planned to check liver function as part of initial workup    Will consider further testing for specific coagulation disorders (e.g., Von Willebrand disease) if initial results are abnormal    Orders:  -     CBC Auto Differential; Future; Expected date: 03/05/2025  -     APTT; Future; Expected date: 03/05/2025  -     Protime-INR; Future; Expected date: 03/05/2025  -     Comprehensive Metabolic Panel; Future; Expected date: 03/05/2025  -     Cancel: Flow Cytometry Panel; Future; Expected date: 03/06/2025  -     Flow Cytometry Panel; Future; Expected date: 03/06/2025    2. Myalgia  -     Lupus Comprehensive Panel; Future; Expected date: 03/06/2025  -     RHEUMATOID ARTHRITIS PANEL; Future; Expected date: 03/06/2025  -     ANTI-SSA + ANTI-SSB; Future; Expected date: 03/06/2025  -     Anti-Neutrophilic Cytoplasmic Antibody; Future; Expected date: 03/06/2025    3. CAPRI (generalized anxiety disorder)  Assessment & Plan:  Well controlled.   Continue Zoloft 25 mg daily.     Practice deep breathing or abdominal breathing exercises when anxiety occurs.  Exercise daily. Get sunlight daily.  Avoid caffeine, alcohol and  stimulants.  Practice positive phrases and repeat throughout the day, along with yoga and relaxation techniques.  Set healthy boundaries, avoid people and conversations that increase stress.  Reports any symptoms of suicidal or homicidal ideations immediately, if clinic is closed go to nearest emergency room.      4. Major depressive disorder with single episode, in partial remission  Assessment & Plan:  Continued Zoloft 25 mg daily for anxiety and depression.    Exercise daily. Get sunlight daily.  Practice positive phrases and repeat throughout the day, along with yoga and relaxation techniques.  Establish good social support, make changes to reduce stress.  Reports any symptoms of suicidal/homicidal ideations or self harm immediately, if clinic is closed go to nearest emergency room.        FOLLOW UP:  Scheduled follow up in 1 week to discuss lab results.  Instructed the patient to complete labs closer to the follow-up visit date.  Contact the office at any time with concerns.         Follow up in about 1 week (around 3/12/2025) for Virtual Visit with Jeyson . In addition to their scheduled follow up, the patient has also been instructed to follow up on as needed basis.     This note was generated with the assistance of ambient listening technology. Verbal consent was obtained by the patient and accompanying visitor(s) for the recording of patient appointment to facilitate this note. I attest to having reviewed and edited the generated note for accuracy, though some syntax or spelling errors may persist. Please contact the author of this note for any clarification.      Jeyson Rodriguez, Adult-Gerontology NP

## 2025-03-05 NOTE — ASSESSMENT & PLAN NOTE
Well controlled.   Continue Zoloft 25 mg daily.     Practice deep breathing or abdominal breathing exercises when anxiety occurs.  Exercise daily. Get sunlight daily.  Avoid caffeine, alcohol and stimulants.  Practice positive phrases and repeat throughout the day, along with yoga and relaxation techniques.  Set healthy boundaries, avoid people and conversations that increase stress.  Reports any symptoms of suicidal or homicidal ideations immediately, if clinic is closed go to nearest emergency room.

## 2025-03-06 ENCOUNTER — LAB VISIT (OUTPATIENT)
Dept: LAB | Facility: HOSPITAL | Age: 30
End: 2025-03-06
Payer: MEDICAID

## 2025-03-06 DIAGNOSIS — M79.10 MYALGIA: ICD-10-CM

## 2025-03-06 DIAGNOSIS — R23.3 SPONTANEOUS ECCHYMOSES: ICD-10-CM

## 2025-03-06 PROCEDURE — 86036 ANCA SCREEN EACH ANTIBODY: CPT

## 2025-03-06 PROCEDURE — 36415 COLL VENOUS BLD VENIPUNCTURE: CPT

## 2025-03-06 PROCEDURE — 86235 NUCLEAR ANTIGEN ANTIBODY: CPT

## 2025-03-06 PROCEDURE — 86431 RHEUMATOID FACTOR QUANT: CPT | Mod: 59

## 2025-03-06 PROCEDURE — 86160 COMPLEMENT ANTIGEN: CPT

## 2025-03-07 LAB
ENA SS-A IGG SER-ACNC: <0.2 U
ENA SS-B IGG SER-ACNC: 0.9 U

## 2025-03-08 LAB
C-ANCA TITR SER IF: NEGATIVE {TITER}
CYCLIC CITRULLINATED PEPTIDE (CCP) (OHS): 0.7 U/ML
P-ANCA SER QL IF: NEGATIVE
RHEUMATOID FACTOR IGA QUANTITATIVE (OLG): 1.9 IU/ML
RHEUMATOID FACTOR IGM QUANTITATIVE (OLG): 0.7 IU/ML

## 2025-03-09 LAB
C3 SERPL-MCNC: 143 MG/DL
C4 SERPL-MCNC: 33 MG/DL
NUCLEAR IGG SER QL IA: NORMAL
RHEUMATOID FACT SERPL-ACNC: <10 IU/ML

## 2025-03-11 ENCOUNTER — RESULTS FOLLOW-UP (OUTPATIENT)
Dept: FAMILY MEDICINE | Facility: CLINIC | Age: 30
End: 2025-03-11
Payer: MEDICAID

## 2025-03-11 ENCOUNTER — E-CONSULT (OUTPATIENT)
Dept: RHEUMATOLOGY | Facility: CLINIC | Age: 30
End: 2025-03-11
Payer: MEDICAID

## 2025-03-11 DIAGNOSIS — R23.3 SPONTANEOUS ECCHYMOSES: Primary | ICD-10-CM

## 2025-03-11 NOTE — CONSULTS
[facility] [place] - Rheumatology [facility]  Response for E-Consult     Patient Name: Steffanie Agee  MRN: [id number]  Primary Care Provider: Jensen Platt DO   Requesting Provider: Jeyson Rodriguez NP  Consults  What is your clinical question? Positive SS-B Ab. Past Sjogren's SS-B was also positive. All other autoimmune work up seems to be negative. Patient has fatigue, myalgia, spontaneous bruising. has a family hx of leukemia and lupus. What are your further recommendations?       Recommendation: SSB antibody without SSA antibody positivity is not associated with Sjogren's syndrome. Please consult hematology for evaluation of spontaneous bruising. If patient has sicca syndrome, please consult ENT specialist for minor salivary gland biopsy to evaluate for Sjogren's syndrome.     https://pubmed.ncbi.nlm.nih.gov/89611461      Total time of Consultation: 5 minute    I did not speak to the requesting provider verbally about this.     *This eConsult is based on the clinical data available to me and is furnished without benefit of a physical examination. The eConsult will need to be interpreted in light of any clinical issues or changes in patient status not available to me at the time of filing this eConsults. Significant changes in patient condition or level of acuity should result in immediate formal consultation and reevaluation. Please alert me if you have further questions.    Thank you for this eConsult referral.     Jersey Goodwin MD  [facility] [place] - Rheumatology [facility]

## 2025-03-12 ENCOUNTER — OFFICE VISIT (OUTPATIENT)
Dept: FAMILY MEDICINE | Facility: CLINIC | Age: 30
End: 2025-03-12
Payer: MEDICAID

## 2025-03-12 ENCOUNTER — E-CONSULT (OUTPATIENT)
Dept: HEMATOLOGY/ONCOLOGY | Facility: HOSPITAL | Age: 30
End: 2025-03-12
Payer: MEDICAID

## 2025-03-12 VITALS — BODY MASS INDEX: 37.48 KG/M2 | RESPIRATION RATE: 20 BRPM | HEIGHT: 62 IN | WEIGHT: 203.69 LBS

## 2025-03-12 DIAGNOSIS — M79.10 MYALGIA: ICD-10-CM

## 2025-03-12 DIAGNOSIS — R21 BUTTERFLY RASH: ICD-10-CM

## 2025-03-12 DIAGNOSIS — L56.8 PHOTOSENSITIVITY DUE TO SUN: ICD-10-CM

## 2025-03-12 DIAGNOSIS — R23.3 SPONTANEOUS ECCHYMOSES: Primary | ICD-10-CM

## 2025-03-12 NOTE — ASSESSMENT & PLAN NOTE
Steffanie reports symptoms consistent with lupus including spontaneous bruising, muscle soreness, fatigue, joint pain, muscle aches, itchy splotchy rash, oral ulcers, headaches, depression, and facial flushing.    Noted family history of lupus, including mother being tested for lupus, aunt on father's side having it, and both aunts on mother's side having it.    Reviewed previous lab results:     CBC normal  H/H stable,PT INR normal, kidney and liver function normal  APTT abnormal  CCP antibodies negative  rheumatoid arthritis panel negative  ANGI negative  complement component normal  rheumatoid factor <10  Sjogren's antibodies SSB 0.9, SSA <0.2.    Assessed that patient's symptoms are consistent with lupus, especially the butterfly rash.    Ordered additional tests including anti-double stranded DNA antibodies, TSH, Smith antibodies, and protein electrophoresis to further investigate lupus.    Plan to refer patient to Rheumatology and Hematology pending test results.

## 2025-03-12 NOTE — ASSESSMENT & PLAN NOTE
Steffanie reports developing a rash, becoming dizzy and nauseated when exposed to sunlight.  Observed facial flushing in patient's cheeks.

## 2025-03-12 NOTE — PROGRESS NOTES
Patient ID: [id number]     Chief Complaint: Follow-up (1 week follow up for bruising/One on her leg is healing but has more on arms and feet/Tender to touch and purple in color)      HPI:     This is a telemedicine note. Patient was treated using telemedicine, real time audio and video, according to [facility] protocols. Jeyson CARLOS AGNP-C,  conducted the visit from the [facility]. The patient participated in the visit at a non-[facility] location selected by the patient, identified below. I am licensed in the state where the patient stated they are located. The patient stated that they understood and accepted the privacy and security risks to their information at their location.     Patient was located at the patient's home.     Steffanie Agee is a 29 year old female presents today for follow-up on spontaneous bruising and to discuss lab results.    CURRENT SYMPTOMS:  She reports spontaneous bruising without associated trauma or falls, along with unexplained muscle soreness without changes in activity level. She experiences fatigue, joint pain, and muscle aches after showering. She develops an itchy, splotchy rash on her chest and legs immediately post-shower that is self-limiting. She reports frequent mouth ulcers, headaches, and depression. She has photosensitivity manifesting as nausea, dizziness, and rash with prolonged sun exposure.    FAMILY HISTORY:  Family history is significant for multiple autoimmune conditions, particularly lupus affecting both maternal aunts, paternal aunt, and both grandparents. There is extensive cancer history including leukemia in two cousins and a  great uncle, ovarian cancer in maternal grandmother, and breast cancer in paternal grandmother and aunt.    OBSTETRIC HISTORY:  She has a history of three live births, including twin pregnancies, and multiple miscarriages.    COVID-19 EXPOSURE:  She reports household exposure to COVID-19, with her son developing  fever on Thursday night.       Past Medical History:   Diagnosis Date    Depression         Past Surgical History:   Procedure Laterality Date    ADENOIDECTOMY      BREAST SURGERY      Cyst removed in right breast     SECTION  20    TONSILLECTOMY      TUBAL LIGATION  2019    Had it untied in         Social History     Socioeconomic History    Marital status:    Tobacco Use    Smoking status: Never    Smokeless tobacco: Never   Substance and Sexual Activity    Alcohol use: Never    Drug use: Never    Sexual activity: Yes     Partners: Female     Birth control/protection: None     Comment: 1 partner for 11 years     Social Drivers of Health     Financial Resource Strain: Low Risk  (2024)    Overall Financial Resource Strain (CARDIA)     Difficulty of Paying Living Expenses: Not very hard   Food Insecurity: Food Insecurity Present (2024)    Hunger Vital Sign     Worried About Running Out of Food in the Last Year: Sometimes true     Ran Out of Food in the Last Year: Sometimes true   Transportation Needs: No Transportation Needs (3/20/2024)    Received from [facility] Subsidiaries and Affiliates    PRAPARE - Transportation     Lack of Transportation (Medical): No     Lack of Transportation (Non-Medical): No   Physical Activity: Insufficiently Active (2024)    Exercise Vital Sign     Days of Exercise per Week: 3 days     Minutes of Exercise per Session: 20 min   Stress: Stress Concern Present (2024)    [employer] - Occupational Stress Questionnaire     Feeling of Stress : Very much   Housing Stability: High Risk (2024)    Housing Stability Vital Sign     Unable to Pay for Housing in the Last Year: Yes        Current Outpatient Medications   Medication Instructions    ferrous sulfate (FEOSOL) 325 mg, With breakfast    prenatal vit no.124/iron/folic (PRENATAL VITAMIN ORAL) Take by mouth.     "sertraline (ZOLOFT) 25 mg, Oral, Daily       Review of patient's allergies indicates:   Allergen Reactions    Ultram [tramadol] Hallucinations        Patient Care Team:  Jensen Platt DO as PCP - General  Jocelyn Cordoba MD (Obstetrics and Gynecology)     Subjective:     Review of Systems    12 point review of systems conducted, negative except as stated in the history of present illness. See HPI for details.    Objective:     Visit Vitals  Resp 20   Ht 5' 2" (1.575 m)   Wt 92.4 kg (203 lb 11.3 oz)   LMP 02/16/2025   BMI 37.26 kg/m²       Physical Exam    Physical Exam: LIMITED DUE TO TELEMEDICINE RESTRICTIONS.  General: Alert and oriented, No acute distress.  Head: Normocephalic, Atraumatic.  Eye: Sclera non-icteric.  Neck/Thyroid:  Full range of motion.  Respiratory: Non-labored respirations, Symmetrical chest wall expansion.  Musculoskeletal: Normal range of motion.  Integumentary:  No visible suspicious lesions or rashes. No diaphoresis.   Neurologic: No focal deficits  Psychiatric: Normal interaction, Coherent speech, Euthymic mood, Appropriate affect       Assessment:       ICD-10-CM ICD-9-CM   1. Spontaneous ecchymoses  R23.3 782.7   2. Photosensitivity due to sun  L56.8 692.72   3. Butterfly rash  R21 782.1   4. Myalgia  M79.10 729.1        Plan:     1. Spontaneous ecchymoses  Assessment & Plan:  Steffanie reports spontaneous bruising without trauma or falls.  Reviewed previous lab results related to bruising: CBC normal, PT INR and PTT normal.  E Consulted with Hematology Oncology regarding spontaneous bruising which is still pending.     E consult with rheumatologist recommends consultation with hematology for evaluation of spontaneous bruising.    Orders:  -     Protein Electrophoresis, Serum, w/Interp; Future; Expected date: 03/12/2025  -     Weiner and Sm/RNP (MAYI) Antibodies, IgG; Future; Expected date: 03/12/2025  -     TSH; Future; Expected date: 03/12/2025  -     Anti-DNA Ab, Double-Stranded; " Future; Expected date: 03/12/2025  -     Ambulatory referral/consult to Hematology / Oncology; Future; Expected date: 03/13/2025    2. Photosensitivity due to sun  Assessment & Plan:  Steffanie reports developing a rash, becoming dizzy and nauseated when exposed to sunlight.  Observed facial flushing in patient's cheeks.      Orders:  -     Protein Electrophoresis, Serum, w/Interp; Future; Expected date: 03/12/2025  -     Weiner and Sm/RNP (MAYI) Antibodies, IgG; Future; Expected date: 03/12/2025  -     TSH; Future; Expected date: 03/12/2025  -     Anti-DNA Ab, Double-Stranded; Future; Expected date: 03/12/2025    3. Butterfly rash  Assessment & Plan:  Steffanie reports symptoms consistent with lupus including spontaneous bruising, muscle soreness, fatigue, joint pain, muscle aches, itchy splotchy rash, oral ulcers, headaches, depression, and facial flushing.    Noted family history of lupus, including mother being tested for lupus, aunt on father's side having it, and both aunts on mother's side having it.    Reviewed previous lab results:     CBC normal  H/H stable,PT INR normal, kidney and liver function normal  APTT abnormal  CCP antibodies negative  rheumatoid arthritis panel negative  ANGI negative  complement component normal  rheumatoid factor <10  Sjogren's antibodies SSB 0.9, SSA <0.2.    Assessed that patient's symptoms are consistent with lupus, especially the butterfly rash.    Ordered additional tests including anti-double stranded DNA antibodies, TSH, Smith antibodies, and protein electrophoresis to further investigate lupus.    Plan to refer patient to Rheumatology and Hematology pending test results.    Orders:  -     Protein Electrophoresis, Serum, w/Interp; Future; Expected date: 03/12/2025  -     Weiner and Sm/RNP (MAYI) Antibodies, IgG; Future; Expected date: 03/12/2025  -     TSH; Future; Expected date: 03/12/2025  -     Anti-DNA Ab, Double-Stranded; Future; Expected date: 03/12/2025    4. Myalgia  -      Protein Electrophoresis, Serum, w/Interp; Future; Expected date: 03/12/2025  -     Weiner and Sm/RNP (MAYI) Antibodies, IgG; Future; Expected date: 03/12/2025  -     TSH; Future; Expected date: 03/12/2025  -     Anti-DNA Ab, Double-Stranded; Future; Expected date: 03/12/2025      Follow up for Keep Scheduled Appointment.. In addition to their scheduled follow up, the patient has also been instructed to follow up on as needed basis.     Future Appointments   Date Time Provider Department Center   7/30/2025  2:40 PM Jensen Platt DO [facility]     Video Time Documentation:  Spent 10 minutes with patient face to face discussed health concerns. More than 50% of this time was spent in counseling and coordination of care.    Jeyson Rodriguez NP

## 2025-03-12 NOTE — ASSESSMENT & PLAN NOTE
Steffanie reports spontaneous bruising without trauma or falls.  Reviewed previous lab results related to bruising: CBC normal, PT INR and PTT normal.  E Consulted with Hematology Oncology regarding spontaneous bruising which is still pending.     E consult with rheumatologist recommends consultation with hematology for evaluation of spontaneous bruising.

## 2025-03-13 ENCOUNTER — LAB VISIT (OUTPATIENT)
Dept: LAB | Facility: HOSPITAL | Age: 30
End: 2025-03-13
Payer: MEDICAID

## 2025-03-13 ENCOUNTER — RESULTS FOLLOW-UP (OUTPATIENT)
Dept: FAMILY MEDICINE | Facility: CLINIC | Age: 30
End: 2025-03-13

## 2025-03-13 DIAGNOSIS — E61.1 IRON DEFICIENCY: ICD-10-CM

## 2025-03-13 DIAGNOSIS — R42 DIZZINESS: ICD-10-CM

## 2025-03-13 DIAGNOSIS — R23.3 SPONTANEOUS ECCHYMOSES: ICD-10-CM

## 2025-03-13 DIAGNOSIS — R21 BUTTERFLY RASH: ICD-10-CM

## 2025-03-13 DIAGNOSIS — L56.8 PHOTOSENSITIVITY DUE TO SUN: ICD-10-CM

## 2025-03-13 DIAGNOSIS — M79.10 MYALGIA: Primary | ICD-10-CM

## 2025-03-13 LAB — TSH SERPL-ACNC: 0.74 UIU/ML (ref 0.35–4.94)

## 2025-03-13 PROCEDURE — 84165 PROTEIN E-PHORESIS SERUM: CPT

## 2025-03-13 PROCEDURE — 36415 COLL VENOUS BLD VENIPUNCTURE: CPT

## 2025-03-13 PROCEDURE — 84443 ASSAY THYROID STIM HORMONE: CPT

## 2025-03-13 PROCEDURE — 86225 DNA ANTIBODY NATIVE: CPT

## 2025-03-13 PROCEDURE — 86235 NUCLEAR ANTIGEN ANTIBODY: CPT

## 2025-03-13 NOTE — PROGRESS NOTES
Discussed E consult recommendations with the patient over the phone.     Will refer to Hematology at [facility].

## 2025-03-13 NOTE — CONSULTS
[facility] HEMATOLOGY/ONCOLOGY  Response for E-Consult     Patient Name: Steffanie Agee  MRN: [id number]  Primary Care Provider: Jensen Platt DO   Requesting Provider: Jeyson Rodriguez NP  E-Consult to Hemonc  Consult performed by: Hitesh Jaramillo MD  Consult ordered by: Jeyson Rodriguez NP  Reason for consult: easy  bruising/abnormal ptt          After evaluation of your eConsult clinical questions, I believe the patient should be scheduled for an office visit in our specialty due to abnormal ptt/easy bruising.    Total time of Consultation: 10 minute    *This eConsult is based on the clinical data available to me and is furnished without benefit of a physician examination.  The eConsult will need to be interpreted in light of any clinical issues of changes in patient status not available to me at the time rime of filing this eConsults.  Significant changes in patient condition of level of acuity should result in a referral for in person consultation and reevaluation.  Please alert me if you have any furth questions.     Thank you for this eConsult referral.     Hitesh Jaramillo MD  [facility] HEMATOLOGY/ONCOLOGY       L3-4

## 2025-03-14 LAB
ALBUMIN % SPEP (OHS): 50.07 (ref 48.1–59.5)
ALBUMIN SERPL-MCNC: 3.2 G/DL (ref 3.5–5)
ALBUMIN/GLOB SERPL: 1 RATIO (ref 1.1–2)
ALPHA 1 GLOB (OHS): 0.25 GM/DL (ref 0–0.4)
ALPHA 1 GLOB% (OHS): 4.03 (ref 2.3–4.9)
ALPHA 2 GLOB % (OHS): 13.78 (ref 6.9–13)
ALPHA 2 GLOB (OHS): 0.87 GM/DL (ref 0.4–1)
BETA GLOB (OHS): 1.21 GM/DL (ref 0.7–1.3)
BETA GLOB% (OHS): 19.23 (ref 13.8–19.7)
GAMMA GLOBULIN % (OHS): 12.88 (ref 10.1–21.9)
GAMMA GLOBULIN (OHS): 0.81 GM/DL (ref 0.4–1.8)
GLOBULIN SER-MCNC: 3.1 GM/DL (ref 2.4–3.5)
M SPIKE % (OHS): ABNORMAL
M SPIKE (OHS): ABNORMAL
PATH REV: NORMAL
PROT SERPL-MCNC: 6.3 GM/DL (ref 6.4–8.3)

## 2025-03-16 LAB
ENA SM IGG SER-ACNC: 1 AU/ML
U1 SNRNP IGG SER-ACNC: 4 UNITS

## 2025-03-17 LAB — DSDNA AB QUANT (OHS): 4.2 IU/ML

## 2025-03-18 ENCOUNTER — RESULTS FOLLOW-UP (OUTPATIENT)
Dept: FAMILY MEDICINE | Facility: CLINIC | Age: 30
End: 2025-03-18

## 2025-03-18 ENCOUNTER — TELEPHONE (OUTPATIENT)
Dept: HEMATOLOGY/ONCOLOGY | Facility: CLINIC | Age: 30
End: 2025-03-18
Payer: MEDICAID

## 2025-03-18 ENCOUNTER — LAB VISIT (OUTPATIENT)
Dept: LAB | Facility: HOSPITAL | Age: 30
End: 2025-03-18
Payer: MEDICAID

## 2025-03-18 DIAGNOSIS — R23.3 SPONTANEOUS ECCHYMOSES: Primary | ICD-10-CM

## 2025-03-18 DIAGNOSIS — R23.3 SPONTANEOUS ECCHYMOSES: ICD-10-CM

## 2025-03-18 LAB
ALBUMIN SERPL-MCNC: 3.9 G/DL (ref 3.5–5)
ALBUMIN/GLOB SERPL: 1.2 RATIO (ref 1.1–2)
ALP SERPL-CCNC: 74 UNIT/L (ref 40–150)
ALT SERPL-CCNC: 13 UNIT/L (ref 0–55)
ANION GAP SERPL CALC-SCNC: 8 MEQ/L
APTT PPP: 23.4 SECONDS (ref 24.5–32.8)
AST SERPL-CCNC: 11 UNIT/L (ref 5–34)
BASOPHILS # BLD AUTO: 0.06 X10(3)/MCL
BASOPHILS NFR BLD AUTO: 0.7 %
BILIRUB SERPL-MCNC: 0.5 MG/DL
BUN SERPL-MCNC: 15 MG/DL (ref 7–18.7)
CALCIUM SERPL-MCNC: 9.8 MG/DL (ref 8.4–10.2)
CHLORIDE SERPL-SCNC: 105 MMOL/L (ref 98–107)
CO2 SERPL-SCNC: 28 MMOL/L (ref 22–29)
CREAT SERPL-MCNC: 0.7 MG/DL (ref 0.55–1.02)
CREAT/UREA NIT SERPL: 21
EOSINOPHIL # BLD AUTO: 0.09 X10(3)/MCL (ref 0–0.9)
EOSINOPHIL NFR BLD AUTO: 1.1 %
ERYTHROCYTE [DISTWIDTH] IN BLOOD BY AUTOMATED COUNT: 13.1 % (ref 11.5–17)
GFR SERPLBLD CREATININE-BSD FMLA CKD-EPI: >60 ML/MIN/1.73/M2
GLOBULIN SER-MCNC: 3.3 GM/DL (ref 2.4–3.5)
GLUCOSE SERPL-MCNC: 95 MG/DL (ref 74–100)
HCT VFR BLD AUTO: 39.2 % (ref 37–47)
HGB BLD-MCNC: 12.9 G/DL (ref 12–16)
IMM GRANULOCYTES # BLD AUTO: 0.02 X10(3)/MCL (ref 0–0.04)
IMM GRANULOCYTES NFR BLD AUTO: 0.2 %
INR PPP: 1
LYMPHOCYTES # BLD AUTO: 1.58 X10(3)/MCL (ref 0.6–4.6)
LYMPHOCYTES NFR BLD AUTO: 19.5 %
MCH RBC QN AUTO: 28.4 PG (ref 27–31)
MCHC RBC AUTO-ENTMCNC: 32.9 G/DL (ref 33–36)
MCV RBC AUTO: 86.3 FL (ref 80–94)
MONOCYTES # BLD AUTO: 0.47 X10(3)/MCL (ref 0.1–1.3)
MONOCYTES NFR BLD AUTO: 5.8 %
NEUTROPHILS # BLD AUTO: 5.9 X10(3)/MCL (ref 2.1–9.2)
NEUTROPHILS NFR BLD AUTO: 72.7 %
NRBC BLD AUTO-RTO: 0 %
PLATELET # BLD AUTO: 352 X10(3)/MCL (ref 130–400)
PMV BLD AUTO: 10.3 FL (ref 7.4–10.4)
POTASSIUM SERPL-SCNC: 4.1 MMOL/L (ref 3.5–5.1)
PROT SERPL-MCNC: 7.2 GM/DL (ref 6.4–8.3)
PROTHROMBIN TIME: 10.3 SECONDS (ref 9.3–11.4)
RBC # BLD AUTO: 4.54 X10(6)/MCL (ref 4.2–5.4)
SODIUM SERPL-SCNC: 141 MMOL/L (ref 136–145)
WBC # BLD AUTO: 8.12 X10(3)/MCL (ref 4.5–11.5)

## 2025-03-18 PROCEDURE — 36415 COLL VENOUS BLD VENIPUNCTURE: CPT

## 2025-03-18 PROCEDURE — 85610 PROTHROMBIN TIME: CPT

## 2025-03-18 PROCEDURE — 85025 COMPLETE CBC W/AUTO DIFF WBC: CPT

## 2025-03-18 PROCEDURE — 85730 THROMBOPLASTIN TIME PARTIAL: CPT

## 2025-03-18 PROCEDURE — 80053 COMPREHEN METABOLIC PANEL: CPT

## 2025-03-18 NOTE — TELEPHONE ENCOUNTER
Please call the patient and let her know that she needs another set of labs before receiving a denial or acceptable for a hematology referral.

## 2025-03-18 NOTE — TELEPHONE ENCOUNTER
Good morning, received referral for hematology. All hematology referrals require two recent sets of labs ranging from 2 weeks to one month apart. Labs need to include both CBC and CMP. Are you able to order more labs?      TONI Bullock  Hem/Onc

## 2025-03-24 ENCOUNTER — E-CONSULT (OUTPATIENT)
Dept: RHEUMATOLOGY | Facility: CLINIC | Age: 30
End: 2025-03-24
Payer: MEDICAID

## 2025-03-24 ENCOUNTER — PATIENT MESSAGE (OUTPATIENT)
Dept: FAMILY MEDICINE | Facility: CLINIC | Age: 30
End: 2025-03-24
Payer: MEDICAID

## 2025-03-24 DIAGNOSIS — R21 BUTTERFLY RASH: ICD-10-CM

## 2025-03-24 DIAGNOSIS — M79.10 MYALGIA: ICD-10-CM

## 2025-03-24 DIAGNOSIS — R21 MALAR RASH: ICD-10-CM

## 2025-03-24 DIAGNOSIS — L56.8 PHOTOSENSITIVITY DUE TO SUN: ICD-10-CM

## 2025-03-24 DIAGNOSIS — R23.3 SPONTANEOUS ECCHYMOSES: Primary | ICD-10-CM

## 2025-03-24 NOTE — CONSULTS
[facility] Rheumatology  Response for E-Consult     Patient Name: Steffanie Agee  MRN: [id number]  Primary Care Provider: Jensen Platt DO   Requesting Provider: Jeyson Rodriguez NP  Consults  Medical Condition: Lupus-MTCD-Sjogrens   What is your clinical question? Positive SS-B Ab. Past Sjogren's SS-B was also positive. All other autoimmune work up seems to be negative. Patient has fatigue, myalgia, spontaneous bruising with butterfly rash. family hx of leukemia and lupus. What are your further recommendations?       Recommendation:   SSB antibody without SSA antibody positivity is not associated with Sjogren's syndrome. Please consult hematology for evaluation of spontaneous bruising .  Please have dermatology evaluate her malar rash - Malar rash from connective tissue disease  Vs Rosaceae.   If dermatologist suspects lupus, refer patient to rheumatology. If dermatologist confirms Rosaceae, consider treatment for possible fibromyalgia.      Total time of Consultation: 5 minute    I did not speak to the requesting provider verbally about this.     *This eConsult is based on the clinical data available to me and is furnished without benefit of a physical examination. The eConsult will need to be interpreted in light of any clinical issues or changes in patient status not available to me at the time of filing this eConsults. Significant changes in patient condition or level of acuity should result in immediate formal consultation and reevaluation. Please alert me if you have further questions.    Thank you for this eConsult referral.     Jersey Goodwin MD  [facility] Rheumatology

## 2025-03-31 ENCOUNTER — TELEPHONE (OUTPATIENT)
Dept: FAMILY MEDICINE | Facility: CLINIC | Age: 30
End: 2025-03-31
Payer: MEDICAID

## 2025-03-31 ENCOUNTER — DOCUMENTATION ONLY (OUTPATIENT)
Dept: FAMILY MEDICINE | Facility: CLINIC | Age: 30
End: 2025-03-31
Payer: MEDICAID

## 2025-03-31 ENCOUNTER — DOCUMENTATION ONLY (OUTPATIENT)
Facility: CLINIC | Age: 30
End: 2025-03-31
Payer: MEDICAID

## 2025-03-31 NOTE — TELEPHONE ENCOUNTER
"Called stated " Zoloft if not working and I'm having Suicidal ideations." When questioned. She stated "No I'm not thinking of killing myself. I would never do that. Its Just like what would my  do if I was not here. I would just like a med adjustment. "   Put her on schedule for tomorrow. " Ok thank yall"   "

## 2025-05-05 ENCOUNTER — PATIENT MESSAGE (OUTPATIENT)
Dept: FAMILY MEDICINE | Facility: CLINIC | Age: 30
End: 2025-05-05
Payer: MEDICAID

## 2025-05-06 DIAGNOSIS — R21 MALAR RASH: Primary | ICD-10-CM

## 2025-06-17 DIAGNOSIS — Z00.00 WELLNESS EXAMINATION: Primary | ICD-10-CM

## 2025-06-21 NOTE — PROGRESS NOTES
History:  Past Medical History:   Diagnosis Date    Depression      Past Surgical History:   Procedure Laterality Date    ADENOIDECTOMY      BREAST SURGERY      Cyst removed in right breast     SECTION  20    TONSILLECTOMY      TUBAL LIGATION  2019    Had it untied in       Social History     Socioeconomic History    Marital status:    Tobacco Use    Smoking status: Never    Smokeless tobacco: Never   Substance and Sexual Activity    Alcohol use: Never    Drug use: Never    Sexual activity: Yes     Partners: Female     Birth control/protection: None     Comment: 1 partner for 11 years     Social Drivers of Health     Financial Resource Strain: Low Risk  (2024)    Overall Financial Resource Strain (CARDIA)     Difficulty of Paying Living Expenses: Not very hard   Food Insecurity: Food Insecurity Present (2024)    Hunger Vital Sign     Worried About Running Out of Food in the Last Year: Sometimes true     Ran Out of Food in the Last Year: Sometimes true   Transportation Needs: No Transportation Needs (3/20/2024)    Received from [facility] Subsidiaries and Affiliates    PRAPARE - Transportation     Lack of Transportation (Medical): No     Lack of Transportation (Non-Medical): No   Physical Activity: Insufficiently Active (2024)    Exercise Vital Sign     Days of Exercise per Week: 3 days     Minutes of Exercise per Session: 20 min   Stress: Stress Concern Present (2024)    [employer] - Occupational Stress Questionnaire     Feeling of Stress : Very much   Housing Stability: High Risk (2024)    Housing Stability Vital Sign     Unable to Pay for Housing in the Last Year: Yes      Family History   Problem Relation Name Age of Onset    Alcohol abuse Mother Lisa montgomery     Depression Mother Lisa montgomery     Drug abuse Mother Lisa montgomery     Early death Mother Lisa montgomery     Alcohol  abuse Father Brayan Agee     Drug abuse Father Brayan Agee     Early death Father Brayan Agee     Arthritis Maternal Grandmother Alysa     Cancer Maternal Grandmother Alysa     Depression Maternal Grandmother Alysa     Diabetes Maternal Grandmother Alysa     Cancer Paternal Grandmother Geni Agee     Depression Paternal Grandmother Geni Agee     Birth defects Son Tiosomy 13     Early death Son Tiosomy 13       Reason for Follow-up:  Reason for consultation:   Spontaneous bruising  Low PTT    History of Present Illness:   Follow-up (Patient reports weakness, fatigue and also stated she has numbness and tingling in both hands and feet. Patient is concerned about her hair shredding and easily bruising)      Oncologic/Hematologic History:  Oncology History    No history exists.   Past medical history: Depression; history of suicidal ideation; history of postpartum depression; generalized anxiety disorder; lupus-MCTD-Sjogren's (positive SS-B antibody); history of iron-deficieny; spontaneous bruising; malar rash; history of multiple miscarriages  Procedure/surgical history: Adenoidectomy; removal of right breast cyst;  2018; tonsillectomy; tubal ligation  (united in )    Family history:    Multiple autoimmune conditions, particularly lupus affecting both maternal aunts, paternal aunt, and both grandparents;   extensive cancer history including leukemia in 2 cousins and a  great uncle; ovarian cancer in maternal grandmother; breast cancer paternal grandmother and aunt  Ob gyn history:  History of 3 live birth, including twin pregnancies; multiple miscarriages      30-year-old lady, referred by Jeyson Rodriguez NP, for evaluation of spontaneous ecchymoses.      Develops rash, becomes dizzy and nauseated when exposed to sunlight.  Spontaneous bruising without trauma or falls.  History of normal CBC, PT, and PTT.  Symptoms suggestive of lupus including spontaneous bruising,  muscle soreness, fatigue, joint pain, muscle aches, itchy splotchy rash, oral ulcers, headaches, depression, and facial flushing.  Noted family history of lupus, including mother being tested for lupus, aunt on father's side having it, and both aunts on mother's side having it.  Ecchymosis tender to touch and purple in color.  Experiences fatigue, joint pain, and muscle ache after showering  Developed itchy splotchy rash on her chest and legs immediately post shower that is self-limiting  Frequent mouth ulcers, headaches, depression  Has photosensitivity manifesting as nausea, dizziness, and rash with a prolonged exposure  Butterfly rash, myalgias      03/24/2025:  Rheumatology note: Jesrey Goodwin MD:   Medical Condition: Lupus-MTCD-Sjogrens  Positive SS-B Ab. Past Sjogren's SS-B was also positive. All other autoimmune work up seems to be negative.   Patient has fatigue, myalgia, spontaneous bruising with butterfly rash. family hx of leukemia and lupus.   Recommendation:   SSB antibody without SSA antibody positivity is not associated with Sjogren's syndrome. Please consult hematology for evaluation of spontaneous bruising .  Please have dermatology evaluate her malar rash - Malar rash from connective tissue disease  Vs Rosaceae.   If dermatologist suspects lupus, refer patient to rheumatology. If dermatologist confirms Rosaceae, consider treatment for possible fibromyalgia.        Labs reviewed:   -CBC:  Essentially unremarkable: WBC and differential normal; hemoglobin normal; platelets normal    -03/05/2025:  PT 9.6 seconds normal, INR 0.9 normal, PTT <23.2 seconds low  -03/18/2025:  PT 10.3 seconds normal, INR 1.0 normal, PTT 23.4 seconds low    [Under most circumstances, shortening of the PT and/or aPTT reflects poor sample collection or preparation techniques]  [However, clotting factors may be increased or activated in vivo, as in malignancy, DIC, or following short-term exercise, resulting in  shortening of clotting times, especially aPTT]  [A shortened clotting time that does not appear to reflect technical error, has been associated with an increased risk of thrombosis, recurrent thrombosis, recurrent miscarriage, or bleeding, and may increase the risk of thrombosis associated with other common thrombotic risk factors (eg, factor V Leiden, obesity, increased levels of D-dimer).  There is no specific intervention recommended based on the laboratory abnormality alone; management of the underlying condition may decrease the thrombotic risk]    -CMP: Unremarkable; specifically, LFTs normal    -2024:  No M spike observed    -TSH normal on 2025, 2024    -2025:  C Anca negative; p-ANCA negative; CCP antibodies 0.7; rheumatoid factors IgA, IgM negative; ANGI IgG negative; rheumatoid factor <10 negative; SSA IgG antibody <0.2 negative; SSB IgG antibody 0.9 negative  -2025:  Double-stranded DNA antibody 4.2 negative; Weiner antibody negative      2025:   Pleasant, healthy-appearing lady who presents for initial hematology consultation.  In no acute discomfort.    Does not use aspirin.  Does not use NSAIDs.  Took Zoloft between 2024-2025.  Currently, not on any SSRIs.  Says that she hemorrhage after her last childbirth in 2024 which was a vaginal birth; did not require blood transfusion (this was [facility])  Did not experienced any unusual bleeding with  in 2019, nor with any surgical procedures prior.  No family history of bleeding or bruising.  No history of malignancy.  Well nourished.  Chronic weakness, fatigue, headaches, numbness and tingling in both hands and feet.  Great appetite.  Reports fatigue, myalgia, spontaneous bruising with butterfly rash. family hx of leukemia and lupus.   Denies epistaxis, bleeding from gums, hemoptysis, hematuria, hematemesis, melena, hematochezia, or bleeding in any other form.  Develops rash,  becomes dizzy and nauseated when exposed to sunlight.  Spontaneous bruising without trauma or falls.  History of normal CBC, PT, and PTT.  Symptoms suggestive of lupus including spontaneous bruising, muscle soreness, fatigue, joint pain, muscle aches, itchy splotchy rash, oral ulcers, headaches, depression, and facial flushing.  Noted family history of lupus, including mother being tested for lupus, aunt on father's side having it, and both aunts on mother's side having it.  Ecchymosis tender to touch and purple in color.  Experiences fatigue, joint pain, and muscle ache after showering  Developed itchy splotchy rash on her chest and legs immediately post shower that is self-limiting  Frequent mouth ulcers, headaches, depression  Has photosensitivity manifesting as nausea, dizziness, and rash with a prolonged exposure  Butterfly rash, myalgias    Interval History:  [No matching plan found]   [No matching plan found]       There is no immunization history on file for this patient.    Allergies as of 06/23/2025 - Reviewed 06/23/2025   Allergen Reaction Noted    Ultram [tramadol] Hallucinations 12/23/2024     Medications:  Medications Ordered Prior to Encounter[1]    Review of Systems:   All systems reviewed and found to be negative except for the symptoms detailed above    Physical Examination:   VITAL SIGNS:   Vitals:    06/23/25 1000   BP: 114/80   Pulse: 84   Resp: 20   Temp: 98 °F (36.7 °C)     GENERAL:  In no apparent distress.    HEAD:  No signs of head trauma.  EYES:  Pupils are equal.  Extraocular motions intact.    EARS:  Hearing grossly intact.  MOUTH:  Oropharynx is normal.   NECK:  No adenopathy, no JVD.     CHEST:  Chest with clear breath sounds bilaterally.  No wheezes, rales, rhonchi.    CARDIAC:  Regular rate and rhythm.  S1 and S2, without murmurs, gallops, rubs.  VASCULAR:  No Edema.  Peripheral pulses normal and equal in all extremities.  ABDOMEN:  Soft, without detectable tenderness.  No sign of  distention.  No   rebound or guarding, and no masses palpated.   Bowel Sounds normal.  MUSCULOSKELETAL:  Good range of motion of all major joints. Extremities without clubbing, cyanosis or edema.    NEUROLOGIC EXAM:  Alert and oriented x 3.  No focal sensory or strength deficits.   Speech normal.  Follows commands.  PSYCHIATRIC:  Mood normal.    Assessment:  Problem List Items Addressed This Visit       Spontaneous ecchymoses - Primary    Low partial thromboplastin time (PTT)     Orders for 06/23/2025:   Check CBC, CMP  Please ask her PCP to discontinue Zoloft because it can cause bleeding/bruising; change to a different, non SSRI antidepressant  Check coagulation factors 8, 9, 11, and 12  Von Willebrand disease panel   Platelet function tests   PT, PTT  Thrombin time   Reptilase time  Vitamin-C level  Check fibrinogen level, D-dimer, euglobulin clot lysis test  Factor 5 Leiden mutation, protein C activity, free protein S antigen, prothrmbin gene mutation, lupus anticoagulant, beta 2 glycoprotein 1 antibody, anticardiolipin antibody  Follow-up in 3 weeks, with labs     Above discussed with the patient.  All questions answered.  Plan of management discussed in detail.    She understands and agrees with this plan.  =================================      # Spontaneous bruising:  -history of spontaneous bruising without trauma or falls  -ecchymoses tender to touch and purple in color  -CBC normal, PT normal, PTT low (x2), CMP unremarkable, LFTs normal, no monoclonal spike, TSH normal, autoimmune workup essentially negative  -low PTT x2  [Under most circumstances, shortening of the PT and/or aPTT reflects poor sample collection or preparation techniques]  [However, clotting factors may be increased or activated in vivo, as in malignancy, DIC, or following short-term exercise, resulting in shortening of clotting times, especially aPTT]  [A shortened clotting time that does not appear to reflect technical error, has been  associated with an increased risk of thrombosis, recurrent thrombosis, recurrent miscarriage, or bleeding, and may increase the risk of thrombosis associated with other common thrombotic risk factors (eg, factor V Leiden, obesity, increased levels of D-dimer).  There is no specific intervention recommended based on the laboratory abnormality alone; management of the underlying condition may decrease the thrombotic risk]  >>>  Plan:  -patient is on sertraline (Zoloft); this can cause bleeding and bruising; SSRIs as a class of medications can cause bleeding or bruising; right changing to some other antidepressant  -low PTT; most likely, poor sample collection or preparation technique; however, may represent increased clotting factors or in vivo activation as an malignancy, DIC, or following salt on exercise; we will check intrinsic pathway clotting factors including factor 8, factor 9, factor 11 and factor 12  -check for von Willebrand disease  -check for family history of significant unexpected bl?e?ing  -platelet function tests:  Measurement of platelet function (eg, platelet aggregation studies) can  be valuable in the evaluation of patients with easy br?i?i?g who have normal baseline  studies and platelet electron microscopy.  Platelet function can be assessed by platelet aggregation studies. The use of a ble?di?g time is not recommended.  Platelet function analyzer (PFA-100), simple and rapid measure of platelet function, maybe more sensitive and reproducible then the bleeding time for diagnosing platelet function abnormalities (however, limited specificity and sensitivity; not a good screening tool for inherited or acquired platelet disorders)  -in this patient, PT and PTT are not prolonged;; therefore, differential diagnosis includes thrombocytopenia, platelet dysfunction, mild deficiency of von Willebrand factor, vascular disorders, and rather unlikely, factor XIII deficiency, and disorder of the fibrinolytic  system  -check for disorders of fibrinolysis: Fibrinogen level, D-dimer (an FDP), euglobulin clot lysis test    -certain other over-the-counter remedies may increase bleeding risk  -Avoid nonprescription remedies which interfere with platelet function for example, aspirin, NSAIDs, ginkgo biloba   -Avoid certain medications, herbal preparations, and dietary supplements that interfere with primary or secondary hemostasis  (ginkgo biloba, garlic, vitamin E, alcohol, SSRIs, certain antibiotics which cause vitamin K deficiency or interfere with platelet dysfunction, glucocorticoids, NSAIDs, anticoagulants, antiplatelet agents, etc.)  -Avoid aspirin and aspirin containing remedies such as Goody powders or BC powders used for migraine headaches or minor aches and pains  -Fish oil does not cause increase of bleeding or surgical blood loss     Relevant labs in this patient:  -CBC:  Essentially unremarkable: WBC and differential normal; hemoglobin normal; platelets normal  -03/05/2025:  PT 9.6 seconds normal, INR 0.9 normal, PTT <23.2 seconds low  -03/18/2025:  PT 10.3 seconds normal, INR 1.0 normal, PTT 23.4 seconds low  -CMP: Unremarkable; specifically, LFTs normal  -03/13/2024:  No M spike observed  -TSH normal on 03/13/2025, 12/23/2024  -03/06/2025:  C Anca negative; p-ANCA negative; CCP antibodies 0.7; rheumatoid factors IgA, IgM negative; ANGI IgG negative; rheumatoid factor <10 negative; SSA IgG antibody <0.2 negative; SSB IgG antibody 0.9 negative  -03/13/2025:  Double-stranded DNA antibody 4.2 negative; Weiner antibody negative    ?T is used to assess the extrinsic pathway of ?l?tti?g, which consists of tissue factor and factor VII and ???bijal?n factors in the common pathway (factors II [prothrombin], V, X, and fibrinogen)    ???T is used to assess the integrity of the intrinsic pathway (prekallikrein, high molecular weight kininogen, factors VIII, IX, XI, XII) as well as the common pathway.  Some patients may have  normal or false-negative test results, especially with Von Willebrand disease or if the disease is mild. Patients with persistent bruising should continue to be followed and may need further testing despite negative test results initially.     # History of symptoms suggestive of lupus-MCTD-Sjogren syndrome:  (but autoimmune workup negative)  -symptoms suggestive of lupus including spontaneous bruising, muscle surveillance, fatigue, joint pain, muscle aches, cheese splotchy rash, oral ulcers, headaches, depression, facial flushing  -family history of lupus, including paternal aunt and maternal aunts x2   -reports itchy splotchy rash on chest and legs immediately after shower that is self-limiting  -frequent mouth ulcers, headaches, depression  -has photosensitivity manifesting as nausea, dizziness, and rash with prolonged sun exposure  -butterfly rash  -rheumatology consult 03/24/2025:  Positive SSB antibody; SSB antibody without SSA antibody positivity is not associated with Sjogren's syndrome  -per electronic records in [facility] autoimmune workup negative:  -03/06/2025:  C Anca negative; p-ANCA negative; CCP antibodies 0.7; rheumatoid factors IgA, IgM negative; ANGI IgG negative; rheumatoid factor <10 negative; SSA IgG antibody <0.2 negative; SSB IgG antibody 0.9 negative  -03/13/2025:  Double-stranded DNA antibody 4.2 negative; Weiner antibody negative    #History of multiple miscarriages:  -03/05/2025:  PT 9.6 seconds normal, INR 0.9 normal, PTT <23.2 seconds low  -03/18/2025:  PT 10.3 seconds normal, INR 1.0 normal, PTT 23.4 seconds low  -low PTT x2  [Under most circumstances, shortening of the PT and/or aPTT reflects poor sample collection or preparation techniques]  [However, clotting factors may be increased or activated in vivo, as in malignancy, DIC, or following short-term exercise, resulting in shortening of clotting times, especially aPTT]  [A shortened clotting time that does not appear to reflect technical  error, has been associated with an increased risk of thrombosis, recurrent thrombosis, recurrent miscarriage, or bleeding, and may increase the risk of thrombosis associated with other common thrombotic risk factors (eg, factor V Leiden, obesity, increased levels of D-dimer).  There is no specific intervention recommended based on the laboratory abnormality alone; management of the underlying condition may decrease the thrombotic risk]  >>>  -check for thrombophilia (Factor 5 Leiden mutation, protein C activity, free protein S antigen, prothrombin gene mutation, lupus anticoagulant, beta 2 glycoprotein 1 antibody, anticardiolipin antibody)    # Depression, postpartum depression, anxiety, history of suicidal ideation:  -on sertraline         Discussion:  Etiology of easy bruisin. Disorders of blood vessels and surrounding tissues: Idiopathic, physical abuse, aging, glucocorticoids, alcohol abuse, wasting/malnutrition, Cushing's syndrome, amyloidosis, Lesa-Danlos syndrome, pseudo xanthoma elasticum, hereditary hemorrhagic telangiectasia, vitamin-C deficiency, Marfan syndrome, osteogenesis imperfecta, connective tissue disease/cryoglobulinemia, or vasculitis (for example, immunoglobulin A vasculitis [Henoch-Schönlein purpura])  2. Platelet abnormalities:  Drugs, renal disease, autoimmune disorders, von Willebrand disease, leukemia, lymphoid malignancy, may Hegglin abnormality, Tyrell Soulier syndrome, Glanzmann thrombasthenia, Gaucher disease, myeloproliferative disorders, etc.  3. Coagulation protein disorders: Liver disease, heavy alcohol use, vitamin K deficiency, warfarin therapy, hemophilia (factor 8, factor 9 deficiency), circulating anticoagulant or inhibitor, dysfibrinogenemia, plasminogen activator deficiency or inhibitor    Frequently seen causes of easy bruising:   Idiopathic, physical abuse, aging (senile purpura), glucocorticoid use, alcohol abuse, wasting, malnutrition, drugs, renal disease, von  Willebrand disease, vitamin K deficiency, warfarin therapy    Drugs that can cause easy bruising:  Anticoagulants, antiplatelet agents including NSAIDs, glucocorticoids, antibodies, SSRIs, alcohol, vitamin-E, garlic, gingko biloba      Follow-up:  No follow-ups on file.           [1]   Current Outpatient Medications on File Prior to Visit   Medication Sig Dispense Refill    ferrous sulfate (FEOSOL) 325 mg (65 mg iron) Tab tablet Take 325 mg by mouth daily with breakfast.      prenatal vit no.124/iron/folic (PRENATAL VITAMIN ORAL) Take by mouth.      sertraline (ZOLOFT) 25 MG tablet Take 1 tablet (25 mg total) by mouth once daily. 30 tablet 11     No current facility-administered medications on file prior to visit.

## 2025-06-22 PROBLEM — R79.1: Status: ACTIVE | Noted: 2025-06-22

## 2025-06-23 ENCOUNTER — OFFICE VISIT (OUTPATIENT)
Dept: HEMATOLOGY/ONCOLOGY | Facility: CLINIC | Age: 30
End: 2025-06-23
Attending: INTERNAL MEDICINE
Payer: MEDICAID

## 2025-06-23 ENCOUNTER — TELEPHONE (OUTPATIENT)
Dept: HEMATOLOGY/ONCOLOGY | Facility: CLINIC | Age: 30
End: 2025-06-23

## 2025-06-23 VITALS
DIASTOLIC BLOOD PRESSURE: 80 MMHG | RESPIRATION RATE: 20 BRPM | OXYGEN SATURATION: 100 % | TEMPERATURE: 98 F | BODY MASS INDEX: 37.84 KG/M2 | HEART RATE: 84 BPM | SYSTOLIC BLOOD PRESSURE: 114 MMHG | WEIGHT: 205.63 LBS | HEIGHT: 62 IN

## 2025-06-23 DIAGNOSIS — L56.8 PHOTOSENSITIVITY: ICD-10-CM

## 2025-06-23 DIAGNOSIS — L29.9 PRURITUS: ICD-10-CM

## 2025-06-23 DIAGNOSIS — R21 SKIN RASH: ICD-10-CM

## 2025-06-23 DIAGNOSIS — R79.1 LOW PARTIAL THROMBOPLASTIN TIME (PTT): ICD-10-CM

## 2025-06-23 DIAGNOSIS — R23.3 SPONTANEOUS ECCHYMOSES: Primary | ICD-10-CM

## 2025-06-23 LAB
ALBUMIN SERPL-MCNC: 4.1 G/DL (ref 3.5–5)
ALBUMIN/GLOB SERPL: 1.2 RATIO (ref 1.1–2)
ALP SERPL-CCNC: 74 UNIT/L (ref 40–150)
ALT SERPL-CCNC: 10 UNIT/L (ref 0–55)
ANION GAP SERPL CALC-SCNC: 7 MEQ/L
APTT PPP: 25.3 SECONDS (ref 23.2–33.7)
AST SERPL-CCNC: 11 UNIT/L (ref 11–45)
BASOPHILS # BLD AUTO: 0.06 X10(3)/MCL
BASOPHILS NFR BLD AUTO: 0.8 %
BILIRUB SERPL-MCNC: 0.7 MG/DL
BUN SERPL-MCNC: 14.5 MG/DL (ref 7–18.7)
CALCIUM SERPL-MCNC: 9.1 MG/DL (ref 8.4–10.2)
CHLORIDE SERPL-SCNC: 107 MMOL/L (ref 98–107)
CLOSURE TME COLL+ADP BLD: 57 SECONDS (ref 46–119)
CLOSURE TME COLL+EPINEP BLD: 91 SECONDS (ref 68–183)
CO2 SERPL-SCNC: 28 MMOL/L (ref 22–29)
CREAT SERPL-MCNC: 0.7 MG/DL (ref 0.55–1.02)
CREAT/UREA NIT SERPL: 21
D DIMER PPP IA.FEU-MCNC: 0.39 UG/ML FEU (ref 0–0.5)
EOSINOPHIL # BLD AUTO: 0.49 X10(3)/MCL (ref 0–0.9)
EOSINOPHIL NFR BLD AUTO: 6.6 %
ERYTHROCYTE [DISTWIDTH] IN BLOOD BY AUTOMATED COUNT: 12.7 % (ref 11.5–17)
FACT IX ACT/NOR PPP: 117 % (ref 59–191)
FACT VIII ACT/NOR PPP: 226 % (ref 59–191)
FIBRINOGEN PPP-MCNC: 383 MG/DL (ref 210–463)
GFR SERPLBLD CREATININE-BSD FMLA CKD-EPI: >60 ML/MIN/1.73/M2
GLOBULIN SER-MCNC: 3.3 GM/DL (ref 2.4–3.5)
GLUCOSE SERPL-MCNC: 82 MG/DL (ref 74–100)
HCT VFR BLD AUTO: 42.1 % (ref 37–47)
HGB BLD-MCNC: 14 G/DL (ref 12–16)
IMM GRANULOCYTES # BLD AUTO: 0.02 X10(3)/MCL (ref 0–0.04)
IMM GRANULOCYTES NFR BLD AUTO: 0.3 %
INR PPP: 0.9
LYMPHOCYTES # BLD AUTO: 1.74 X10(3)/MCL (ref 0.6–4.6)
LYMPHOCYTES NFR BLD AUTO: 23.3 %
MCH RBC QN AUTO: 29.4 PG (ref 27–31)
MCHC RBC AUTO-ENTMCNC: 33.3 G/DL (ref 33–36)
MCV RBC AUTO: 88.3 FL (ref 80–94)
MONOCYTES # BLD AUTO: 0.34 X10(3)/MCL (ref 0.1–1.3)
MONOCYTES NFR BLD AUTO: 4.5 %
NEUTROPHILS # BLD AUTO: 4.83 X10(3)/MCL (ref 2.1–9.2)
NEUTROPHILS NFR BLD AUTO: 64.5 %
NRBC BLD AUTO-RTO: 0 %
PLATELET # BLD AUTO: 246 X10(3)/MCL (ref 130–400)
PMV BLD AUTO: 10.8 FL (ref 7.4–10.4)
POTASSIUM SERPL-SCNC: 3.6 MMOL/L (ref 3.5–5.1)
PROT SERPL-MCNC: 7.4 GM/DL (ref 6.4–8.3)
PROTHROMBIN TIME: 12.5 SECONDS (ref 11.4–14)
RBC # BLD AUTO: 4.77 X10(6)/MCL (ref 4.2–5.4)
SODIUM SERPL-SCNC: 142 MMOL/L (ref 136–145)
TT IMM BOVINE THROMBIN PPP: 17 SECONDS (ref 0–22)
WBC # BLD AUTO: 7.48 X10(3)/MCL (ref 4.5–11.5)

## 2025-06-23 PROCEDURE — 85025 COMPLETE CBC W/AUTO DIFF WBC: CPT | Performed by: INTERNAL MEDICINE

## 2025-06-23 PROCEDURE — 85384 FIBRINOGEN ACTIVITY: CPT | Performed by: INTERNAL MEDICINE

## 2025-06-23 PROCEDURE — 85270 CLOT FACTOR XI PTA: CPT | Performed by: INTERNAL MEDICINE

## 2025-06-23 PROCEDURE — 85397 CLOTTING FUNCT ACTIVITY: CPT | Performed by: INTERNAL MEDICINE

## 2025-06-23 PROCEDURE — 85379 FIBRIN DEGRADATION QUANT: CPT | Performed by: INTERNAL MEDICINE

## 2025-06-23 PROCEDURE — 99214 OFFICE O/P EST MOD 30 MIN: CPT | Mod: PBBFAC | Performed by: INTERNAL MEDICINE

## 2025-06-23 PROCEDURE — 85635 REPTILASE TEST: CPT | Performed by: INTERNAL MEDICINE

## 2025-06-23 PROCEDURE — 85730 THROMBOPLASTIN TIME PARTIAL: CPT | Performed by: INTERNAL MEDICINE

## 2025-06-23 PROCEDURE — 85576 BLOOD PLATELET AGGREGATION: CPT | Performed by: INTERNAL MEDICINE

## 2025-06-23 PROCEDURE — 85280 CLOT FACTOR XII HAGEMAN: CPT | Performed by: INTERNAL MEDICINE

## 2025-06-23 PROCEDURE — 30000890 MAYO GENERIC ORDERABLE: Performed by: INTERNAL MEDICINE

## 2025-06-23 PROCEDURE — 85303 CLOT INHIBIT PROT C ACTIVITY: CPT | Performed by: INTERNAL MEDICINE

## 2025-06-23 PROCEDURE — 85240 CLOT FACTOR VIII AHG 1 STAGE: CPT | Mod: 59 | Performed by: INTERNAL MEDICINE

## 2025-06-23 PROCEDURE — 36415 COLL VENOUS BLD VENIPUNCTURE: CPT | Performed by: INTERNAL MEDICINE

## 2025-06-23 PROCEDURE — 82180 ASSAY OF ASCORBIC ACID: CPT | Performed by: INTERNAL MEDICINE

## 2025-06-23 PROCEDURE — 85613 RUSSELL VIPER VENOM DILUTED: CPT | Performed by: INTERNAL MEDICINE

## 2025-06-23 PROCEDURE — 85306 CLOT INHIBIT PROT S FREE: CPT | Performed by: INTERNAL MEDICINE

## 2025-06-23 PROCEDURE — 86147 CARDIOLIPIN ANTIBODY EA IG: CPT | Mod: 59 | Performed by: INTERNAL MEDICINE

## 2025-06-23 PROCEDURE — 80053 COMPREHEN METABOLIC PANEL: CPT | Performed by: INTERNAL MEDICINE

## 2025-06-23 PROCEDURE — 85610 PROTHROMBIN TIME: CPT | Performed by: INTERNAL MEDICINE

## 2025-06-23 PROCEDURE — 85670 THROMBIN TIME PLASMA: CPT | Performed by: INTERNAL MEDICINE

## 2025-06-23 PROCEDURE — 86146 BETA-2 GLYCOPROTEIN ANTIBODY: CPT | Mod: 59 | Performed by: INTERNAL MEDICINE

## 2025-06-23 PROCEDURE — 85250 CLOT FACTOR IX PTC/CHRSTMAS: CPT | Performed by: INTERNAL MEDICINE

## 2025-06-23 PROCEDURE — 85360 EUGLOBULIN LYSIS: CPT | Performed by: INTERNAL MEDICINE

## 2025-06-23 NOTE — Clinical Note
Orders for 06/23/2025:  Check CBC, CMP Please ask her PCP to discontinue Zoloft because it can cause bleeding/bruising; change to a different, non SSRI antidepressant Check coagulation factors 8, 9, 11, and 12 Von Willebrand disease panel  Platelet function tests  PT, PTT Thrombin time  Reptilase time Vitamin-C level Check fibrinogen level, D-dimer, euglobulin clot lysis test Factor 5 Leiden mutation, protein C activity, free protein S antigen, prothrmbin gene mutation, lupus anticoagulant, beta 2 glycoprotein 1 antibody, anticardiolipin antibody Follow-up in 3 weeks, with labs

## 2025-06-24 ENCOUNTER — TELEPHONE (OUTPATIENT)
Dept: FAMILY MEDICINE | Facility: CLINIC | Age: 30
End: 2025-06-24
Payer: MEDICAID

## 2025-06-24 LAB
B2 GLYCOPROT1 IGG SERPL IA-ACNC: <9.4 SGU
B2 GLYCOPROT1 IGM SERPL IA-ACNC: <9.4 SMU
CARDIOLIPIN IGG QUANT (OHS): 0.5 GPL U/ML
CARDIOLIPIN IGM QUANT (OHS): 1.3 MPL U/ML
DRVV CONF RATIO (OHS): 1.02
DRVV NORM RATIO (OHS): 1.07 (ref 0–1.19)
DRVV SCR RATIO (OHS): 1.09
FACT VIII ACT/NOR PPP: 199 % (ref 55–200)
FACT XI ACT/NOR PPP: 143 % (ref 55–150)
FACT XII ACT/NOR PPP: 135 % (ref 55–180)
L.A. PATH INTERP (OHS): NORMAL
LA ST CALC (OHS): 2.2 SECONDS (ref 0–7.9)
PROT C ACT/NOR PPP CHRO: 108 % (ref 70–150)
PROT S FREE AG ACT/NOR PPP IA: 87 % (ref 50–160)
REPTILASE TIME: 17.8 SEC (ref 14–23.9)
VON WILLEBRAND EVAL PPP-IMP: NORMAL
VWF AG ACT/NOR PPP IA: 170 % (ref 55–200)
VWF:AC ACT/NOR PPP IA: 164 % (ref 55–200)

## 2025-06-24 NOTE — TELEPHONE ENCOUNTER
Copied from CRM #[id number]. Topic: General Inquiry - Information Request  >> Jun 23, 2025  3:45 PM Ambika wrote:  Who Called: Mery    Caller is requesting assistance/information from provider's office.    Symptoms (please be specific):   How long has patient had these symptoms:    List of preferred pharmacies on file (remove unneeded): [unfilled]  If different, enter pharmacy into here including location and phone number:       Preferred Method of Contact: Phone Call  Patient's Preferred Phone Number on File: [phone]   Best Call Back Number, if different:[phone]  Additional Information: Mery with hemotology said  [facility]  because it causes bleeding and bruising and to switch to a different SSRI .Please Advise

## 2025-06-25 NOTE — TELEPHONE ENCOUNTER
Please move her appointment up so I can discuss alternative medications with her. Does she feel ready to taper off of the medication or dose she feel like she needs something else?

## 2025-06-26 ENCOUNTER — DOCUMENTATION ONLY (OUTPATIENT)
Dept: HEMATOLOGY/ONCOLOGY | Facility: CLINIC | Age: 30
End: 2025-06-26
Payer: MEDICAID

## 2025-06-26 ENCOUNTER — RESULTS FOLLOW-UP (OUTPATIENT)
Dept: HEMATOLOGY/ONCOLOGY | Facility: CLINIC | Age: 30
End: 2025-06-26
Payer: MEDICAID

## 2025-06-26 ENCOUNTER — TELEPHONE (OUTPATIENT)
Dept: HEMATOLOGY/ONCOLOGY | Facility: CLINIC | Age: 30
End: 2025-06-26
Payer: MEDICAID

## 2025-06-26 ENCOUNTER — LAB VISIT (OUTPATIENT)
Dept: LAB | Facility: HOSPITAL | Age: 30
End: 2025-06-26
Attending: INTERNAL MEDICINE
Payer: MEDICAID

## 2025-06-26 DIAGNOSIS — R79.1 LOW PARTIAL THROMBOPLASTIN TIME (PTT): ICD-10-CM

## 2025-06-26 DIAGNOSIS — R23.3 SPONTANEOUS ECCHYMOSES: ICD-10-CM

## 2025-06-26 DIAGNOSIS — R23.3 SPONTANEOUS ECCHYMOSES: Primary | ICD-10-CM

## 2025-06-26 DIAGNOSIS — R21 SKIN RASH: ICD-10-CM

## 2025-06-26 DIAGNOSIS — Z00.00 WELLNESS EXAMINATION: ICD-10-CM

## 2025-06-26 LAB
25(OH)D3+25(OH)D2 SERPL-MCNC: 44 NG/ML (ref 30–80)
COAGULATION SPECIALIST REVIEW: NORMAL
COAGULATION SPECIALIST REVIEW: NORMAL
F2 C.20210G>A GENO BLD/T: NEGATIVE
F5 P.R506Q BLD/T QL: NEGATIVE
HCV AB SERPL QL IA: NONREACTIVE
HIV 1+2 AB+HIV1 P24 AG SERPL QL IA: NONREACTIVE
PROVIDER SIGNING NAME: NORMAL
PROVIDER SIGNING NAME: NORMAL
VIT C SERPL-MCNC: 0.2 MG/DL (ref 0.4–2)

## 2025-06-26 PROCEDURE — 87389 HIV-1 AG W/HIV-1&-2 AB AG IA: CPT

## 2025-06-26 PROCEDURE — 82306 VITAMIN D 25 HYDROXY: CPT

## 2025-06-26 PROCEDURE — 82955 ASSAY OF G6PD ENZYME: CPT

## 2025-06-26 PROCEDURE — 86803 HEPATITIS C AB TEST: CPT

## 2025-06-26 PROCEDURE — 36415 COLL VENOUS BLD VENIPUNCTURE: CPT

## 2025-06-26 NOTE — PROGRESS NOTES
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 q.day (may administered with food)  Inform patient to start eating citrus fruits, tomatoes/traumatic use, potatoes, broccoli, cabbage, cauliflower, spinach, strawberries

## 2025-06-26 NOTE — TELEPHONE ENCOUNTER
Called patient per Dr. Jones to inform patient to start eating citrus fruits, tomatoes, potatoes, broccoli, cabbage, cauliflower, spinach, strawberries. Informed patient that we will do additional labs and then decide if she needed to be started on ascorbic acid. Patient inquired about vitamin d level stating that she does eat a lot of citrus fruits and wondered if we could check her vitamin d level.

## 2025-06-27 ENCOUNTER — RESULTS FOLLOW-UP (OUTPATIENT)
Dept: HEMATOLOGY/ONCOLOGY | Facility: CLINIC | Age: 30
End: 2025-06-27

## 2025-06-27 LAB — G6PD RBC-CCNT: 10.3 U/G HB (ref 8–11.9)

## 2025-06-30 ENCOUNTER — TELEPHONE (OUTPATIENT)
Dept: HEMATOLOGY/ONCOLOGY | Facility: CLINIC | Age: 30
End: 2025-06-30
Payer: MEDICAID

## 2025-06-30 ENCOUNTER — DOCUMENTATION ONLY (OUTPATIENT)
Dept: HEMATOLOGY/ONCOLOGY | Facility: CLINIC | Age: 30
End: 2025-06-30
Payer: MEDICAID

## 2025-06-30 DIAGNOSIS — E54 VITAMIN C DEFICIENCY: Primary | ICD-10-CM

## 2025-06-30 RX ORDER — ASCORBIC ACID 500 MG
500 TABLET ORAL DAILY
Qty: 30 TABLET | Refills: 3 | Status: SHIPPED | OUTPATIENT
Start: 2025-06-30

## 2025-07-15 ENCOUNTER — PATIENT MESSAGE (OUTPATIENT)
Dept: HEMATOLOGY/ONCOLOGY | Facility: CLINIC | Age: 30
End: 2025-07-15
Payer: MEDICAID

## 2025-07-16 ENCOUNTER — LAB VISIT (OUTPATIENT)
Dept: LAB | Facility: HOSPITAL | Age: 30
End: 2025-07-16
Attending: FAMILY MEDICINE
Payer: MEDICAID

## 2025-07-16 ENCOUNTER — OFFICE VISIT (OUTPATIENT)
Dept: FAMILY MEDICINE | Facility: CLINIC | Age: 30
End: 2025-07-16
Payer: MEDICAID

## 2025-07-16 VITALS
HEART RATE: 82 BPM | BODY MASS INDEX: 36.84 KG/M2 | HEIGHT: 62 IN | TEMPERATURE: 98 F | WEIGHT: 200.19 LBS | DIASTOLIC BLOOD PRESSURE: 74 MMHG | OXYGEN SATURATION: 99 % | SYSTOLIC BLOOD PRESSURE: 105 MMHG | RESPIRATION RATE: 20 BRPM

## 2025-07-16 DIAGNOSIS — R21 BUTTERFLY RASH: ICD-10-CM

## 2025-07-16 DIAGNOSIS — Z00.00 ROUTINE GENERAL MEDICAL EXAMINATION AT A HEALTH CARE FACILITY: ICD-10-CM

## 2025-07-16 DIAGNOSIS — Z00.00 ROUTINE GENERAL MEDICAL EXAMINATION AT A HEALTH CARE FACILITY: Primary | ICD-10-CM

## 2025-07-16 DIAGNOSIS — E61.1 IRON DEFICIENCY: ICD-10-CM

## 2025-07-16 LAB
ALBUMIN SERPL-MCNC: 3.7 G/DL (ref 3.5–5)
ALBUMIN/GLOB SERPL: 1.3 RATIO (ref 1.1–2)
ALP SERPL-CCNC: 61 UNIT/L (ref 40–150)
ALT SERPL-CCNC: 10 UNIT/L (ref 0–55)
ANION GAP SERPL CALC-SCNC: 7 MEQ/L
AST SERPL-CCNC: 12 UNIT/L (ref 11–45)
BASOPHILS # BLD AUTO: 0.05 X10(3)/MCL
BASOPHILS NFR BLD AUTO: 0.8 %
BILIRUB SERPL-MCNC: 1 MG/DL
BUN SERPL-MCNC: 15 MG/DL (ref 7–18.7)
CALCIUM SERPL-MCNC: 9.2 MG/DL (ref 8.4–10.2)
CHLORIDE SERPL-SCNC: 108 MMOL/L (ref 98–107)
CHOLEST SERPL-MCNC: 140 MG/DL
CHOLEST/HDLC SERPL: 4 {RATIO} (ref 0–5)
CO2 SERPL-SCNC: 29 MMOL/L (ref 22–29)
CREAT SERPL-MCNC: 0.7 MG/DL (ref 0.55–1.02)
CREAT/UREA NIT SERPL: 21
EOSINOPHIL # BLD AUTO: 0.16 X10(3)/MCL (ref 0–0.9)
EOSINOPHIL NFR BLD AUTO: 2.5 %
ERYTHROCYTE [DISTWIDTH] IN BLOOD BY AUTOMATED COUNT: 13.2 % (ref 11.5–17)
FERRITIN SERPL-MCNC: 31.33 NG/ML (ref 4.63–204)
GFR SERPLBLD CREATININE-BSD FMLA CKD-EPI: >60 ML/MIN/1.73/M2
GLOBULIN SER-MCNC: 2.9 GM/DL (ref 2.4–3.5)
GLUCOSE SERPL-MCNC: 84 MG/DL (ref 74–100)
HCT VFR BLD AUTO: 39.3 % (ref 37–47)
HDLC SERPL-MCNC: 33 MG/DL (ref 35–60)
HGB BLD-MCNC: 12.7 G/DL (ref 12–16)
IMM GRANULOCYTES # BLD AUTO: 0.02 X10(3)/MCL (ref 0–0.04)
IMM GRANULOCYTES NFR BLD AUTO: 0.3 %
IRON SATN MFR SERPL: 34 % (ref 20–50)
IRON SERPL-MCNC: 117 UG/DL (ref 50–170)
LDLC SERPL CALC-MCNC: 99 MG/DL (ref 50–140)
LYMPHOCYTES # BLD AUTO: 2.5 X10(3)/MCL (ref 0.6–4.6)
LYMPHOCYTES NFR BLD AUTO: 38.5 %
MCH RBC QN AUTO: 28.2 PG (ref 27–31)
MCHC RBC AUTO-ENTMCNC: 32.3 G/DL (ref 33–36)
MCV RBC AUTO: 87.3 FL (ref 80–94)
MONOCYTES # BLD AUTO: 0.42 X10(3)/MCL (ref 0.1–1.3)
MONOCYTES NFR BLD AUTO: 6.5 %
NEUTROPHILS # BLD AUTO: 3.34 X10(3)/MCL (ref 2.1–9.2)
NEUTROPHILS NFR BLD AUTO: 51.4 %
NRBC BLD AUTO-RTO: 0 %
PLATELET # BLD AUTO: 272 X10(3)/MCL (ref 130–400)
PMV BLD AUTO: 10.4 FL (ref 7.4–10.4)
POTASSIUM SERPL-SCNC: 4 MMOL/L (ref 3.5–5.1)
PROT SERPL-MCNC: 6.6 GM/DL (ref 6.4–8.3)
RBC # BLD AUTO: 4.5 X10(6)/MCL (ref 4.2–5.4)
SODIUM SERPL-SCNC: 144 MMOL/L (ref 136–145)
TIBC SERPL-MCNC: 224 UG/DL (ref 70–310)
TIBC SERPL-MCNC: 341 UG/DL (ref 250–450)
TRANSFERRIN SERPL-MCNC: 298 MG/DL (ref 180–382)
TRIGL SERPL-MCNC: 39 MG/DL (ref 37–140)
TSH SERPL-ACNC: 1.1 UIU/ML (ref 0.35–4.94)
VLDLC SERPL CALC-MCNC: 8 MG/DL
WBC # BLD AUTO: 6.49 X10(3)/MCL (ref 4.5–11.5)

## 2025-07-16 PROCEDURE — 80061 LIPID PANEL: CPT

## 2025-07-16 PROCEDURE — 36415 COLL VENOUS BLD VENIPUNCTURE: CPT

## 2025-07-16 PROCEDURE — 80053 COMPREHEN METABOLIC PANEL: CPT

## 2025-07-16 PROCEDURE — 84443 ASSAY THYROID STIM HORMONE: CPT

## 2025-07-16 PROCEDURE — 82728 ASSAY OF FERRITIN: CPT

## 2025-07-16 PROCEDURE — 83550 IRON BINDING TEST: CPT

## 2025-07-16 PROCEDURE — 99395 PREV VISIT EST AGE 18-39: CPT | Mod: ,,, | Performed by: FAMILY MEDICINE

## 2025-07-16 PROCEDURE — 3074F SYST BP LT 130 MM HG: CPT | Mod: CPTII,,, | Performed by: FAMILY MEDICINE

## 2025-07-16 PROCEDURE — 3008F BODY MASS INDEX DOCD: CPT | Mod: CPTII,,, | Performed by: FAMILY MEDICINE

## 2025-07-16 PROCEDURE — 3078F DIAST BP <80 MM HG: CPT | Mod: CPTII,,, | Performed by: FAMILY MEDICINE

## 2025-07-16 PROCEDURE — 85025 COMPLETE CBC W/AUTO DIFF WBC: CPT

## 2025-07-16 NOTE — PROGRESS NOTES
Patient ID: [id number]     Chief Complaint: Annual Exam        HPI:     Steffanie Agee is a 30 y.o. female here today for an annual wellness visit. Reviewed and discussed lab results.      History of Present Illness               -------------------------------------    Depression        Past Surgical History:   Procedure Laterality Date    ADENOIDECTOMY      BREAST SURGERY      Cyst removed in right breast     SECTION  20    TONSILLECTOMY      TUBAL LIGATION  2019    Had it untied in        Review of patient's allergies indicates:   Allergen Reactions    Ultram [tramadol] Hallucinations       Outpatient Medications Marked as Taking for the 25 encounter (Office Visit) with Jensen Platt DO   Medication Sig Dispense Refill    ascorbic acid, vitamin C, (VITAMIN C) 500 MG tablet Take 1 tablet (500 mg total) by mouth once daily. 30 tablet 3    ferrous sulfate (FEOSOL) 325 mg (65 mg iron) Tab tablet Take 325 mg by mouth daily with breakfast.      prenatal vit no.124/iron/folic (PRENATAL VITAMIN ORAL) Take by mouth.         Social History[1]     Family History   Problem Relation Name Age of Onset    Alcohol abuse Mother Lisa montgomery     Depression Mother Lisa montgomery     Drug abuse Mother Lisa montgomery     Early death Mother Lisa montgomery     Alcohol abuse Father Brayan Agee     Drug abuse Father Brayan Agee     Early death Father Brayan Agee     Arthritis Maternal Grandmother Alysa     Cancer Maternal Grandmother Alysa     Depression Maternal Grandmother Alysa     Diabetes Maternal Grandmother Alysa     Cancer Paternal Grandmother Geni Agee     Depression Paternal Grandmother Geni Agee     Birth defects Son Tiosomy 13     Early death Son Tiosomy 13         Patient Care Team:  Jensen Platt DO as PCP - General  Jocelyn Cordoba MD (Obstetrics and Gynecology)  Wilberto King FNP (Dermatology)  Jarvis Jones MD as Consulting Physician (Hematology  "and Oncology)       Subjective:     ROS    See HPI for details  All Other ROS: Negative except as stated in HPI.       Objective:     /74 (BP Location: Left arm, Patient Position: Sitting)   Pulse 82   Temp 98.1 °F (36.7 °C)   Resp 20   Ht 5' 2" (1.575 m)   Wt 90.8 kg (200 lb 3.2 oz)   LMP 07/09/2025 (Exact Date)   SpO2 99%   BMI 36.62 kg/m²     Physical Exam  Vitals reviewed.   Constitutional:       General: She is not in acute distress.     Appearance: Normal appearance.   Cardiovascular:      Rate and Rhythm: Normal rate and regular rhythm.      Heart sounds: No murmur heard.     No friction rub. No gallop.   Pulmonary:      Effort: No respiratory distress.      Breath sounds: No wheezing, rhonchi or rales.   Musculoskeletal:         General: No swelling, tenderness or deformity.      Right lower leg: No edema.      Left lower leg: No edema.   Skin:     General: Skin is warm and dry.      Findings: Rash present. No lesion.   Neurological:      General: No focal deficit present.      Mental Status: She is alert.   Psychiatric:         Mood and Affect: Mood normal.         Assessment:       ICD-10-CM ICD-9-CM   1. Routine general medical examination at a health care facility  Z00.00 V70.0   2. Butterfly rash  R21 782.1        Plan:     Assessment & Plan               Health Maintenance Topics with due status: Not Due       Topic Last Completion Date    TETANUS VACCINE 10/06/2016    Pap Smear 05/08/2024    Influenza Vaccine 01/27/2025    RSV Vaccine (Age 60+ and Pregnant patients) Not Due      Vaccinations -   There is no immunization history on file for this patient.     1. Routine general medical examination at a health care facility    2. Butterfly rash  - Ambulatory referral/consult to Rheumatology; Future      Medication List with Changes/Refills   Current Medications    ASCORBIC ACID, VITAMIN C, (VITAMIN C) 500 MG TABLET    Take 1 tablet (500 mg total) by mouth once daily.       Start Date: " 6/30/2025 End Date: --    FERROUS SULFATE (FEOSOL) 325 MG (65 MG IRON) TAB TABLET    Take 325 mg by mouth daily with breakfast.       Start Date: --        End Date: --    PRENATAL VIT NO.124/IRON/FOLIC (PRENATAL VITAMIN ORAL)    Take by mouth.       Start Date: --        End Date: --   Discontinued Medications    SERTRALINE (ZOLOFT) 25 MG TABLET    Take 1 tablet (25 mg total) by mouth once daily.       Start Date: 12/23/2024End Date: 7/16/2025        The patient's Health Maintenance was reviewed and the following appears to be due at this time:   There are no preventive care reminders to display for this patient.  This note was generated with the assistance of ambient listening technology. Verbal consent was obtained by the patient and accompanying visitor(s) for the recording of patient appointment to facilitate this note. I attest to having reviewed and edited the generated note for accuracy, though some syntax or spelling errors may persist. Please contact the author of this note for any clarification.       Follow up in about 1 year (around 7/16/2026) for Wellness. In addition to their scheduled follow up, the patient has also been instructed to follow up on as needed basis.                    [1]   Social History  Socioeconomic History    Marital status:    Tobacco Use    Smoking status: Never    Smokeless tobacco: Never   Substance and Sexual Activity    Alcohol use: Never    Drug use: Never    Sexual activity: Yes     Partners: Female     Birth control/protection: None     Comment: 1 partner for 11 years     Social Drivers of Health     Financial Resource Strain: Low Risk  (7/9/2025)    Overall Financial Resource Strain (CARDIA)     Difficulty of Paying Living Expenses: Not hard at all   Food Insecurity: No Food Insecurity (7/9/2025)    Hunger Vital Sign     Worried About Running Out of Food in the Last Year: Never true     Ran Out of Food in the Last Year: Never true   Transportation Needs: No  Transportation Needs (7/9/2025)    PRAPARE - Transportation     Lack of Transportation (Medical): No     Lack of Transportation (Non-Medical): No   Physical Activity: Insufficiently Active (7/9/2025)    Exercise Vital Sign     Days of Exercise per Week: 3 days     Minutes of Exercise per Session: 20 min   Stress: No Stress Concern Present (7/9/2025)    [employer] [place] [employer] - Occupational Stress Questionnaire     Feeling of Stress : Only a little   Housing Stability: Low Risk  (7/9/2025)    Housing Stability Vital Sign     Unable to Pay for Housing in the Last Year: No     Number of Times Moved in the Last Year: 1     Homeless in the Last Year: No

## 2025-07-20 PROBLEM — E54 ASCORBIC ACID DEFICIENCY: Status: ACTIVE | Noted: 2025-07-20

## 2025-07-20 NOTE — PROGRESS NOTES
History:  Past Medical History:   Diagnosis Date    Depression      Past Surgical History:   Procedure Laterality Date    ADENOIDECTOMY      BREAST SURGERY      Cyst removed in right breast     SECTION  20    TONSILLECTOMY      TUBAL LIGATION  2019    Had it untied in       Social History     Socioeconomic History    Marital status:    Tobacco Use    Smoking status: Never    Smokeless tobacco: Never   Substance and Sexual Activity    Alcohol use: Never    Drug use: Never    Sexual activity: Yes     Partners: Female     Birth control/protection: None     Comment: 1 partner for 11 years     Social Drivers of Health     Financial Resource Strain: Low Risk  (2025)    Overall Financial Resource Strain (CARDIA)     Difficulty of Paying Living Expenses: Not hard at all   Food Insecurity: No Food Insecurity (2025)    Hunger Vital Sign     Worried About Running Out of Food in the Last Year: Never true     Ran Out of Food in the Last Year: Never true   Transportation Needs: No Transportation Needs (2025)    PRAPARE - Transportation     Lack of Transportation (Medical): No     Lack of Transportation (Non-Medical): No   Physical Activity: Insufficiently Active (2025)    Exercise Vital Sign     Days of Exercise per Week: 3 days     Minutes of Exercise per Session: 20 min   Stress: No Stress Concern Present (2025)    [employer] [place] [employer] - Occupational Stress Questionnaire     Feeling of Stress : Only a little   Housing Stability: Low Risk  (2025)    Housing Stability Vital Sign     Unable to Pay for Housing in the Last Year: No     Number of Times Moved in the Last Year: 1     Homeless in the Last Year: No      Family History   Problem Relation Name Age of Onset    Alcohol abuse Mother Lisa montgomery     Depression Mother Lisa montgomery     Drug abuse Mother Lisa montgomery     Early death Mother Lisa montgomery     Alcohol abuse Father Brayan Agee     Drug  abuse Father Brayan Agee     Early death Father Brayan Agee     Arthritis Maternal Grandmother Alysa     Cancer Maternal Grandmother Alysa     Depression Maternal Grandmother Alysa     Diabetes Maternal Grandmother Alysa     Cancer Paternal Grandmother Geni Agee     Depression Paternal Grandmother Geni Agee     Birth defects Son Tiosomy 13     Early death Son Tiosomy 13       Reason for Follow-up:  Spontaneous bruising  Found to have ascorbic acid deficiency   Low PTT    History of Present Illness:   Spontaneous ecchymoses      Oncologic/Hematologic History:  Oncology History    No history exists.   Past medical history: Depression; history of suicidal ideation; history of postpartum depression; generalized anxiety disorder; lupus-MCTD-Sjogren's (positive SS-B antibody); history of iron-deficieny; spontaneous bruising; malar rash; history of multiple miscarriages  Procedure/surgical history: Adenoidectomy; removal of right breast cyst;  2018; tonsillectomy; tubal ligation  (united in )    Family history:    Multiple autoimmune conditions, particularly lupus affecting both maternal aunts, paternal aunt, and both grandparents;   extensive cancer history including leukemia in 2 cousins and a  great uncle; ovarian cancer in maternal grandmother; breast cancer paternal grandmother and aunt  Ob gyn history:  History of 3 live birth, including twin pregnancies; multiple miscarriages      30-year-old lady, referred by Jeyson Rodriguez NP, for evaluation of spontaneous ecchymoses.      Develops rash, becomes dizzy and nauseated when exposed to sunlight.  Spontaneous bruising without trauma or falls.  History of normal CBC, PT, and PTT.  Symptoms suggestive of lupus including spontaneous bruising, muscle soreness, fatigue, joint pain, muscle aches, itchy splotchy rash, oral ulcers, headaches, depression, and facial flushing.  Noted family history of lupus, including mother  being tested for lupus, aunt on father's side having it, and both aunts on mother's side having it.  Ecchymosis tender to touch and purple in color.  Experiences fatigue, joint pain, and muscle ache after showering  Developed itchy splotchy rash on her chest and legs immediately post shower that is self-limiting  Frequent mouth ulcers, headaches, depression  Has photosensitivity manifesting as nausea, dizziness, and rash with a prolonged exposure  Butterfly rash, myalgias    03/24/2025:  Rheumatology note: Jersey Goodwin MD:   Medical Condition: Lupus-MTCD-Sjogrens  Positive SS-B Ab. Past Sjogren's SS-B was also positive. All other autoimmune work up seems to be negative.   Patient has fatigue, myalgia, spontaneous bruising with butterfly rash. family hx of leukemia and lupus.   Recommendation:   SSB antibody without SSA antibody positivity is not associated with Sjogren's syndrome. Please consult hematology for evaluation of spontaneous bruising .  Please have dermatology evaluate her malar rash - Malar rash from connective tissue disease  Vs Rosaceae.   If dermatologist suspects lupus, refer patient to rheumatology. If dermatologist confirms Rosaceae, consider treatment for possible fibromyalgia.      Labs reviewed:   -CBC:  Essentially unremarkable: WBC and differential normal; hemoglobin normal; platelets normal    -03/05/2025:  PT 9.6 seconds normal, INR 0.9 normal, PTT <23.2 seconds low  -03/18/2025:  PT 10.3 seconds normal, INR 1.0 normal, PTT 23.4 seconds low    [Under most circumstances, shortening of the PT and/or aPTT reflects poor sample collection or preparation techniques]  [However, clotting factors may be increased or activated in vivo, as in malignancy, DIC, or following short-term exercise, resulting in shortening of clotting times, especially aPTT]  [A shortened clotting time that does not appear to reflect technical error, has been associated with an increased risk of thrombosis,  recurrent thrombosis, recurrent miscarriage, or bleeding, and may increase the risk of thrombosis associated with other common thrombotic risk factors (eg, factor V Leiden, obesity, increased levels of D-dimer).  There is no specific intervention recommended based on the laboratory abnormality alone; management of the underlying condition may decrease the thrombotic risk]    -CMP: Unremarkable; specifically, LFTs normal    -2024:  No M spike observed    -TSH normal on 2025, 2024    -2025:  C Anca negative; p-ANCA negative; CCP antibodies 0.7; rheumatoid factors IgA, IgM negative; ANGI IgG negative; rheumatoid factor <10 negative; SSA IgG antibody <0.2 negative; SSB IgG antibody 0.9 negative  -2025:  Double-stranded DNA antibody 4.2 negative; Weiner antibody negative      2025:   Pleasant, healthy-appearing lady who presents for initial hematology consultation.  In no acute discomfort.    Does not use aspirin.  Does not use NSAIDs.  Took Zoloft between 2024-2025.  Currently, not on any SSRIs.  Says that she hemorrhage after her last childbirth in 2024 which was a vaginal birth; did not require blood transfusion (this was [facility])  Did not experienced any unusual bleeding with  in 2019, nor with any surgical procedures prior.  No family history of bleeding or bruising.  No history of malignancy.  Well nourished.  Chronic weakness, fatigue, headaches, numbness and tingling in both hands and feet.  Great appetite.  Reports fatigue, myalgia, spontaneous bruising with butterfly rash. family hx of leukemia and lupus.   Denies epistaxis, bleeding from gums, hemoptysis, hematuria, hematemesis, melena, hematochezia, or bleeding in any other form.  Develops rash, becomes dizzy and nauseated when exposed to sunlight.  Spontaneous bruising without trauma or falls.  History of normal CBC, PT, and PTT.  Symptoms suggestive of lupus including  spontaneous bruising, muscle soreness, fatigue, joint pain, muscle aches, itchy splotchy rash, oral ulcers, headaches, depression, and facial flushing.  Noted family history of lupus, including mother being tested for lupus, aunt on father's side having it, and both aunts on mother's side having it.  Ecchymosis tender to touch and purple in color.  Experiences fatigue, joint pain, and muscle ache after showering  Developed itchy splotchy rash on her chest and legs immediately post shower that is self-limiting  Frequent mouth ulcers, headaches, depression  Has photosensitivity manifesting as nausea, dizziness, and rash with a prolonged exposure  Butterfly rash, myalgias    Interval History:  [No matching plan found]   [No matching plan found]     07/22/2025:  -06/23/2025: CMP normal  -06/23/2025: CBC normal; PT, INR, PTT normal, fibrinogen 383 normal, D-dimer 0.39 normal; thrombin time 17 seconds normal; coagulation factors 8, 11, 12 within normal limits; platelet function assay normal (Col/ADP 57 sec; Col Epi 91 seconds); factor 9 level 117% normal; factor 8 level 226% elevated; euglobulin clot lysis time > 60 minutes normal (reference Range:> 60 minutes); reptilase time 17.8 seconds normal; no laboratory evidence of von Willebrand disease (coagulation factor 8 activity 199% normal; von Willebrand factor antigen 170% normal; von Willebrand factor activity 164% normal  -06/23/2025: Cardiolipin antibodies IgG, IgM negative; thrombin time 17 seconds normal; beta 2 glycoprotein 1 antibodies IgG, IgM negative; lupus anticoagulant negative; factor 5 Leiden mutation negative; protein C activity 108% normal; protein S antigen free 87% normal; prothrombin gene mutation negative  -06/23/2025: Ascorbic acid level 0.2 mg/dL, low (reference Range: 0.4-2.0)  -06/26/2025: G6PD level 10.3, normal  -06/26/2025:  Vitamin-D level 44 normal; hep C antibody nonreactive; HIV nonreactive  -06/30/2025: Prescribed ascorbic acid 500 mg p.o.  q.day  -educated patient in foods with high ascorbic acid content including citrus fruits, tomatoes/tomato juice, potatoes, broccoli, cabbage, cauliflower, spinach, strawberries  Presents for a follow-up visit.  We discussed all labs in detail.      There is no immunization history on file for this patient.    Allergies as of 07/22/2025 - Reviewed 07/22/2025   Allergen Reaction Noted    Ultram [tramadol] Hallucinations 12/23/2024     Medications:  Medications Ordered Prior to Encounter[1]    Review of Systems:   All systems reviewed and found to be negative except for the symptoms detailed above    Physical Examination:   VITAL SIGNS:   Vitals:    07/22/25 1310   BP: 111/74   Pulse: 92   Resp: 18   Temp: 98.2 °F (36.8 °C)       GENERAL:  In no apparent distress.    HEAD:  No signs of head trauma.  EYES:  Pupils are equal.  Extraocular motions intact.    EARS:  Hearing grossly intact.  MOUTH:  Oropharynx is normal.   NECK:  No adenopathy, no JVD.     CHEST:  Chest with clear breath sounds bilaterally.  No wheezes, rales, rhonchi.    CARDIAC:  Regular rate and rhythm.  S1 and S2, without murmurs, gallops, rubs.  VASCULAR:  No Edema.  Peripheral pulses normal and equal in all extremities.  ABDOMEN:  Soft, without detectable tenderness.  No sign of distention.  No   rebound or guarding, and no masses palpated.   Bowel Sounds normal.  MUSCULOSKELETAL:  Good range of motion of all major joints. Extremities without clubbing, cyanosis or edema.    NEUROLOGIC EXAM:  Alert and oriented x 3.  No focal sensory or strength deficits.   Speech normal.  Follows commands.  PSYCHIATRIC:  Mood normal.    Assessment:  Problem List Items Addressed This Visit       Spontaneous ecchymoses    Photosensitivity due to sun    Butterfly rash - Primary    Low partial thromboplastin time (PTT)    Ascorbic acid deficiency       Orders for 07/22/2025:  Check vitamin-C level today  Please inform PCP that Zoloft can cause bleeding and bruising,  therefore, if possible, then I would like to change it to a non SSRI antidepressant  No follow-up with us    Above discussed with the patient.  All questions answered.  Discussed labs and gave her copies of relevant results.    She understands and agrees with this plan.  =================================      # Spontaneous bruising:  -history of spontaneous bruising without trauma or falls  -ecchymoses tender to touch and purple in color  -CBC normal, PT normal, PTT low (x2), CMP unremarkable, LFTs normal, no monoclonal spike, TSH normal, autoimmune workup essentially negative  -low PTT x2  [Under most circumstances, shortening of the PT and/or aPTT reflects poor sample collection or preparation techniques]  [However, clotting factors may be increased or activated in vivo, as in malignancy, DIC, or following short-term exercise, resulting in shortening of clotting times, especially aPTT]  [A shortened clotting time that does not appear to reflect technical error, has been associated with an increased risk of thrombosis, recurrent thrombosis, recurrent miscarriage, or bleeding, and may increase the risk of thrombosis associated with other common thrombotic risk factors (eg, factor V Leiden, obesity, increased levels of D-dimer).  There is no specific intervention recommended based on the laboratory abnormality alone; management of the underlying condition may decrease the thrombotic risk]  -06/23/2025: CMP normal  -06/23/2025: CBC normal; PT, INR, PTT normal, fibrinogen 383 normal, D-dimer 0.39 normal; thrombin time 17 seconds normal; coagulation factors 8, 11, 12 within normal limits; platelet function assay normal (Col/ADP 57 sec; Col Epi 91 seconds); factor 9 level 117% normal; factor 8 level 226% elevated; euglobulin clot lysis time > 60 minutes normal (reference Range:> 60 minutes); reptilase time 17.8 seconds normal; no laboratory evidence of von Willebrand disease (coagulation factor 8 activity 199% normal;  von Willebrand factor antigen 170% normal; von Willebrand factor activity 164% normal  -06/23/2025: Cardiolipin antibodies IgG, IgM negative; thrombin time 17 seconds normal; beta 2 glycoprotein 1 antibodies IgG, IgM negative; lupus anticoagulant negative; factor 5 Leiden mutation negative; protein C activity 108% normal; protein S antigen free 87% normal; prothrombin gene mutation negative  -06/23/2025: Ascorbic acid level 0.2 mg/dL, low (reference Range: 0.4-2.0)  -06/26/2025: G6PD level 10.3, normal  -06/26/2025:  Vitamin-D level 44 normal; hep C antibody nonreactive; HIV nonreactive  -06/30/2025: Prescribed ascorbic acid 500 mg p.o. q.day  -educated patient in foods with high ascorbic acid content including citrus fruits, tomatoes/tomato juice, potatoes, broccoli, cabbage, cauliflower, spinach, strawberries  Briefly:  -referred for evaluation of spontaneous bruising  -exhaustive evaluation negative for any bleeding diathesis  -only finding:  Low ascorbic level  -06/23/2025:  Ascorbic acid level 0.2 mg/dL, low (reference Range: 0.4-2.0)  -after making sure that she was not G6PD deficiency, we started ascorbic acid 500 mg p.o. q.day on 06/30/2025  -educated patient in foods with high ascorbic acid content including citrus fruits, tomatoes/tomato juice, potatoes, broccoli, cabbage, cauliflower, spinach, strawberries  -she does not have florid manifestations of scurvy  >>>  Plan:  -patient is on sertraline (Zoloft); this can cause bleeding and bruising; SSRIs as a class of medications can cause bleeding or bruising; have message PCP to try try changing to some other antidepressant  -continue ascorbic acid 500 mg p.o. q.day x1 month (until 07/23/2025)  -educated patient in foods with high ascorbic acid content including citrus fruits, tomatoes/tomato juice, potatoes, broccoli, cabbage, cauliflower, spinach, strawberries  -recheck ascorbic acid level in 1 month (end of July)    Discussion:  -in this patient, PT and  PTT were not prolonged;; therefore, differential diagnosis included thrombocytopenia, platelet dysfunction, mild deficiency of von Willebrand factor, vascular disorders, and rather unlikely, factor XIII deficiency, and disorder of the fibrinolytic system  >>>  -ultimately, found to have ascorbic acid deficiency    Discussion:   -certain other over-the-counter remedies may increase bleeding risk  -Avoid nonprescription remedies which interfere with platelet function for example, aspirin, NSAIDs, ginkgo biloba   -Avoid certain medications, herbal preparations, and dietary supplements that interfere with primary or secondary hemostasis  (ginkgo biloba, garlic, vitamin E, alcohol, SSRIs, certain antibiotics which cause vitamin K deficiency or interfere with platelet dysfunction, glucocorticoids, NSAIDs, anticoagulants, antiplatelet agents, etc.)  -Avoid aspirin and aspirin containing remedies such as Goody powders or BC powders used for migraine headaches or minor aches and pains  -Fish oil does not cause increase of bleeding or surgical blood loss     Relevant labs in this patient, apart from the ones mentioned above:  -CBC:  Essentially unremarkable: WBC and differential normal; hemoglobin normal; platelets normal  -03/05/2025:  PT 9.6 seconds normal, INR 0.9 normal, PTT <23.2 seconds low  -03/18/2025:  PT 10.3 seconds normal, INR 1.0 normal, PTT 23.4 seconds low  -CMP: Unremarkable; specifically, LFTs normal  -03/13/2024:  No M spike observed  -TSH normal on 03/13/2025, 12/23/2024  -03/06/2025:  C Anca negative; p-ANCA negative; CCP antibodies 0.7; rheumatoid factors IgA, IgM negative; ANGI IgG negative; rheumatoid factor <10 negative; SSA IgG antibody <0.2 negative; SSB IgG antibody 0.9 negative  -03/13/2025:  Double-stranded DNA antibody 4.2 negative; Weiner antibody negative    Discussion:  -?T is used to assess the extrinsic pathway of ?l?tti?g, which consists of tissue factor and factor VII and ???bijal?n factors  in the common pathway (factors II [prothrombin], V, X, and fibrinogen)  -???T is used to assess the integrity of the intrinsic pathway (prekallikrein, high molecular weight kininogen, factors VIII, IX, XI, XII) as well as the common pathway.  Some patients may have normal or false-negative test results, especially with Von Willebrand disease or if the disease is mild. Patients with persistent bruising should continue to be followed and may need further testing despite negative test results initially.     # History of symptoms suggestive of lupus-MCTD-Sjogren syndrome:  (but autoimmune workup negative)  -symptoms suggestive of lupus including spontaneous bruising, muscle surveillance, fatigue, joint pain, muscle aches, cheese splotchy rash, oral ulcers, headaches, depression, facial flushing  -family history of lupus, including paternal aunt and maternal aunts x2   -reports itchy splotchy rash on chest and legs immediately after shower that is self-limiting  -frequent mouth ulcers, headaches, depression  -has photosensitivity manifesting as nausea, dizziness, and rash with prolonged sun exposure  -butterfly rash  -rheumatology consult 03/24/2025:  Positive SSB antibody; SSB antibody without SSA antibody positivity is not associated with Sjogren's syndrome  -per electronic records in [facility] autoimmune workup negative:  -03/06/2025:  C Anca negative; p-ANCA negative; CCP antibodies 0.7; rheumatoid factors IgA, IgM negative; ANGI IgG negative; rheumatoid factor <10 negative; SSA IgG antibody <0.2 negative; SSB IgG antibody 0.9 negative  -03/13/2025:  Double-stranded DNA antibody 4.2 negative; Weiner antibody negative    #History of multiple miscarriages:  -03/05/2025:  PT 9.6 seconds normal, INR 0.9 normal, PTT <23.2 seconds low  -03/18/2025:  PT 10.3 seconds normal, INR 1.0 normal, PTT 23.4 seconds low  -low PTT x2  [Under most circumstances, shortening of the PT and/or aPTT reflects poor sample collection or preparation  techniques]  [However, clotting factors may be increased or activated in vivo, as in malignancy, DIC, or following short-term exercise, resulting in shortening of clotting times, especially aPTT]  [A shortened clotting time that does not appear to reflect technical error, has been associated with an increased risk of thrombosis, recurrent thrombosis, recurrent miscarriage, or bleeding, and may increase the risk of thrombosis associated with other common thrombotic risk factors (eg, factor V Leiden, obesity, increased levels of D-dimer).  There is no specific intervention recommended based on the laboratory abnormality alone; management of the underlying condition may decrease the thrombotic risk]  -2025: Cardiolipin antibodies IgG, IgM negative; thrombin time 17 seconds normal; beta 2 glycoprotein 1 antibodies IgG, IgM negative; lupus anticoagulant negative; factor 5 Leiden mutation negative; protein C activity 108% normal; protein S antigen free 87% normal; prothrombin gene mutation negative  Briefly:  -history of multiple miscarriages  -history of low PTT x2  -subsequently, PT and PTT normal  -hypercoagulable workup negative    # Depression, postpartum depression, anxiety, history of suicidal ideation:  -on sertraline    SSRIs as a class of medications can cause bleeding or bruising; have message PCP to try try changing to some other antidepressant      Discussion:  Etiology of easy bruisin. Disorders of blood vessels and surrounding tissues: Idiopathic, physical abuse, aging, glucocorticoids, alcohol abuse, wasting/malnutrition, Cushing's syndrome, amyloidosis, Lesa-Danlos syndrome, pseudo xanthoma elasticum, hereditary hemorrhagic telangiectasia, vitamin-C deficiency, Marfan syndrome, osteogenesis imperfecta, connective tissue disease/cryoglobulinemia, or vasculitis (for example, immunoglobulin A vasculitis [Henoch-Schönlein purpura])  2. Platelet abnormalities:  Drugs, renal disease, autoimmune  disorders, von Willebrand disease, leukemia, lymphoid malignancy, may Hegglin abnormality, Tyrell Soulier syndrome, Glanzmann thrombasthenia, Gaucher disease, myeloproliferative disorders, etc.  3. Coagulation protein disorders: Liver disease, heavy alcohol use, vitamin K deficiency, warfarin therapy, hemophilia (factor 8, factor 9 deficiency), circulating anticoagulant or inhibitor, dysfibrinogenemia, plasminogen activator deficiency or inhibitor    Frequently seen causes of easy bruising:   Idiopathic, physical abuse, aging (senile purpura), glucocorticoid use, alcohol abuse, wasting, malnutrition, drugs, renal disease, von Willebrand disease, vitamin K deficiency, warfarin therapy    Drugs that can cause easy bruising:  Anticoagulants, antiplatelet agents including NSAIDs, glucocorticoids, antibodies, SSRIs, alcohol, vitamin-E, garlic, gingko biloba      Follow-up:  No follow-ups on file.        Answers submitted by the patient for this visit:  Review of Systems Questionnaire (Submitted on 7/15/2025)  appetite change : No  unexpected weight change: No  mouth sores: No  visual disturbance: No  cough: No  shortness of breath: No  chest pain: No  abdominal pain: No  diarrhea: No  frequency: No  back pain: No  rash: No  headaches: No  adenopathy: No  nervous/ anxious: No         [1]   Current Outpatient Medications on File Prior to Visit   Medication Sig Dispense Refill    ascorbic acid, vitamin C, (VITAMIN C) 500 MG tablet Take 1 tablet (500 mg total) by mouth once daily. 30 tablet 3    ferrous sulfate (FEOSOL) 325 mg (65 mg iron) Tab tablet Take 325 mg by mouth daily with breakfast.      prenatal vit no.124/iron/folic (PRENATAL VITAMIN ORAL) Take by mouth.       No current facility-administered medications on file prior to visit.

## 2025-07-22 ENCOUNTER — DOCUMENTATION ONLY (OUTPATIENT)
Dept: HEMATOLOGY/ONCOLOGY | Facility: CLINIC | Age: 30
End: 2025-07-22
Payer: MEDICAID

## 2025-07-22 ENCOUNTER — OFFICE VISIT (OUTPATIENT)
Dept: HEMATOLOGY/ONCOLOGY | Facility: CLINIC | Age: 30
End: 2025-07-22
Attending: INTERNAL MEDICINE
Payer: MEDICAID

## 2025-07-22 VITALS
BODY MASS INDEX: 37.54 KG/M2 | HEART RATE: 92 BPM | WEIGHT: 204 LBS | TEMPERATURE: 98 F | HEIGHT: 62 IN | SYSTOLIC BLOOD PRESSURE: 111 MMHG | OXYGEN SATURATION: 97 % | DIASTOLIC BLOOD PRESSURE: 74 MMHG | RESPIRATION RATE: 18 BRPM

## 2025-07-22 DIAGNOSIS — L56.8 PHOTOSENSITIVITY DUE TO SUN: ICD-10-CM

## 2025-07-22 DIAGNOSIS — R23.3 SPONTANEOUS ECCHYMOSES: ICD-10-CM

## 2025-07-22 DIAGNOSIS — R79.1 LOW PARTIAL THROMBOPLASTIN TIME (PTT): ICD-10-CM

## 2025-07-22 DIAGNOSIS — E54 VITAMIN C DEFICIENCY: ICD-10-CM

## 2025-07-22 DIAGNOSIS — R21 BUTTERFLY RASH: Primary | ICD-10-CM

## 2025-07-22 DIAGNOSIS — E54 ASCORBIC ACID DEFICIENCY: ICD-10-CM

## 2025-07-22 PROCEDURE — 1160F RVW MEDS BY RX/DR IN RCRD: CPT | Mod: CPTII,,, | Performed by: INTERNAL MEDICINE

## 2025-07-22 PROCEDURE — 3008F BODY MASS INDEX DOCD: CPT | Mod: CPTII,,, | Performed by: INTERNAL MEDICINE

## 2025-07-22 PROCEDURE — 99214 OFFICE O/P EST MOD 30 MIN: CPT | Mod: S$PBB,,, | Performed by: INTERNAL MEDICINE

## 2025-07-22 PROCEDURE — 3074F SYST BP LT 130 MM HG: CPT | Mod: CPTII,,, | Performed by: INTERNAL MEDICINE

## 2025-07-22 PROCEDURE — 1159F MED LIST DOCD IN RCRD: CPT | Mod: CPTII,,, | Performed by: INTERNAL MEDICINE

## 2025-07-22 PROCEDURE — 3078F DIAST BP <80 MM HG: CPT | Mod: CPTII,,, | Performed by: INTERNAL MEDICINE

## 2025-07-22 PROCEDURE — 82180 ASSAY OF ASCORBIC ACID: CPT | Performed by: INTERNAL MEDICINE

## 2025-07-22 PROCEDURE — 99213 OFFICE O/P EST LOW 20 MIN: CPT | Mod: PBBFAC | Performed by: INTERNAL MEDICINE

## 2025-07-22 NOTE — Clinical Note
Orders for 07/22/2025: Check vitamin-C level today Please inform PCP that Zoloft can cause bleeding and bruising, therefore, if possible, then I would like to change it to a non SSRI antidepressant No follow-up with us

## 2025-07-22 NOTE — PROGRESS NOTES
Mery Marks LPN Aggarwal, Sudhir, MD  Informed patients pcp at last office visit (to discontinue Zoloft). Patient stated she stopped taking zoloft around 2 months ago and is not on anything else currently.    (because SSRIs can cause bleeding or bruising)

## 2025-07-25 ENCOUNTER — TELEPHONE (OUTPATIENT)
Dept: HEMATOLOGY/ONCOLOGY | Facility: CLINIC | Age: 30
End: 2025-07-25
Payer: MEDICAID

## 2025-07-25 LAB — MAYO GENERIC ORDERABLE RESULT: NORMAL

## 2025-08-24 ENCOUNTER — HOSPITAL ENCOUNTER (EMERGENCY)
Facility: HOSPITAL | Age: 30
Discharge: HOME | End: 2025-08-24
Attending: GENERAL PRACTICE
Payer: MEDICAID

## 2025-08-24 VITALS
OXYGEN SATURATION: 99 % | HEART RATE: 88 BPM | HEIGHT: 62 IN | TEMPERATURE: 98 F | SYSTOLIC BLOOD PRESSURE: 102 MMHG | RESPIRATION RATE: 14 BRPM | DIASTOLIC BLOOD PRESSURE: 72 MMHG | WEIGHT: 205 LBS | BODY MASS INDEX: 37.73 KG/M2

## 2025-08-24 DIAGNOSIS — R11.0 NAUSEA: ICD-10-CM

## 2025-08-24 DIAGNOSIS — N34.2 INFECTIVE URETHRITIS: ICD-10-CM

## 2025-08-24 DIAGNOSIS — R53.1 WEAKNESS: ICD-10-CM

## 2025-08-24 DIAGNOSIS — K59.00 CONSTIPATION, UNSPECIFIED CONSTIPATION TYPE: Primary | ICD-10-CM

## 2025-08-24 LAB
ACCEPTIBLE SP GR UR QL: 1.02 (ref 1–1.03)
ALBUMIN SERPL-MCNC: 3.8 G/DL (ref 3.5–5)
ALBUMIN/GLOB SERPL: 1.3 RATIO (ref 1.1–2)
ALP SERPL-CCNC: 59 UNIT/L (ref 40–150)
ALT SERPL-CCNC: 14 UNIT/L (ref 0–55)
AMPHET UR QL SCN: NEGATIVE
ANION GAP SERPL CALC-SCNC: 9 MEQ/L
AST SERPL-CCNC: 10 UNIT/L (ref 11–45)
B-HCG UR QL: NEGATIVE
BACTERIA #/AREA URNS AUTO: ABNORMAL /HPF
BARBITURATE SCN PRESENT UR: NEGATIVE
BASOPHILS # BLD AUTO: 0.05 X10(3)/MCL
BASOPHILS NFR BLD AUTO: 0.7 %
BENZODIAZ UR QL SCN: NEGATIVE
BILIRUB SERPL-MCNC: 0.4 MG/DL
BILIRUB UR QL STRIP.AUTO: NEGATIVE
BUN SERPL-MCNC: 10 MG/DL (ref 7–18.7)
CALCIUM SERPL-MCNC: 9.2 MG/DL (ref 8.4–10.2)
CANNABINOIDS UR QL SCN: NEGATIVE
CHLORIDE SERPL-SCNC: 107 MMOL/L (ref 98–107)
CK SERPL-CCNC: 38 U/L (ref 29–168)
CLARITY UR: CLEAR
CO2 SERPL-SCNC: 24 MMOL/L (ref 22–29)
COCAINE UR QL SCN: NEGATIVE
COLOR UR AUTO: YELLOW
CREAT SERPL-MCNC: 0.71 MG/DL (ref 0.55–1.02)
CREAT/UREA NIT SERPL: 14
EOSINOPHIL # BLD AUTO: 0.08 X10(3)/MCL (ref 0–0.9)
EOSINOPHIL NFR BLD AUTO: 1.1 %
ERYTHROCYTE [DISTWIDTH] IN BLOOD BY AUTOMATED COUNT: 13 % (ref 11.5–17)
FENTANYL UR QL SCN: NEGATIVE
GFR SERPLBLD CREATININE-BSD FMLA CKD-EPI: >60 ML/MIN/1.73/M2
GLOBULIN SER-MCNC: 3 GM/DL (ref 2.4–3.5)
GLUCOSE SERPL-MCNC: 128 MG/DL (ref 74–100)
GLUCOSE UR QL STRIP: NEGATIVE
HCT VFR BLD AUTO: 38.5 % (ref 37–47)
HGB BLD-MCNC: 12.7 G/DL (ref 12–16)
HGB UR QL STRIP: NEGATIVE
IMM GRANULOCYTES # BLD AUTO: 0.02 X10(3)/MCL (ref 0–0.04)
IMM GRANULOCYTES NFR BLD AUTO: 0.3 %
KETONES UR QL STRIP: NEGATIVE
LEUKOCYTE ESTERASE UR QL STRIP: ABNORMAL
LYMPHOCYTES # BLD AUTO: 1.84 X10(3)/MCL (ref 0.6–4.6)
LYMPHOCYTES NFR BLD AUTO: 25 %
MCH RBC QN AUTO: 28.7 PG (ref 27–31)
MCHC RBC AUTO-ENTMCNC: 33 G/DL (ref 33–36)
MCV RBC AUTO: 86.9 FL (ref 80–94)
MDMA UR QL SCN: NEGATIVE
MONOCYTES # BLD AUTO: 0.42 X10(3)/MCL (ref 0.1–1.3)
MONOCYTES NFR BLD AUTO: 5.7 %
NEUTROPHILS # BLD AUTO: 4.94 X10(3)/MCL (ref 2.1–9.2)
NEUTROPHILS NFR BLD AUTO: 67.2 %
NITRITE UR QL STRIP: NEGATIVE
NRBC BLD AUTO-RTO: 0 %
NT-PROBNP SERPL-MCNC: 77 PG/ML
OHS QRS DURATION: 80 MS
OHS QTC CALCULATION: 421 MS
OPIATES UR QL SCN: NEGATIVE
PCP UR QL: NEGATIVE
PH UR STRIP: 8 [PH]
PH UR: 8 [PH] (ref 3–11)
PLATELET # BLD AUTO: 256 X10(3)/MCL (ref 130–400)
PMV BLD AUTO: 10.2 FL (ref 7.4–10.4)
POTASSIUM SERPL-SCNC: 4.4 MMOL/L (ref 3.5–5.1)
PROT SERPL-MCNC: 6.8 GM/DL (ref 6.4–8.3)
PROT UR QL STRIP: NEGATIVE
RBC # BLD AUTO: 4.43 X10(6)/MCL (ref 4.2–5.4)
RBC #/AREA URNS AUTO: ABNORMAL /HPF
SODIUM SERPL-SCNC: 140 MMOL/L (ref 136–145)
SP GR UR STRIP.AUTO: 1.02 (ref 1–1.03)
SQUAMOUS #/AREA URNS AUTO: ABNORMAL /HPF
TROPONIN I SERPL HS-MCNC: <3 NG/L
TSH SERPL-ACNC: 1.75 UIU/ML (ref 0.35–4.94)
UROBILINOGEN UR STRIP-ACNC: 0.2
WBC # BLD AUTO: 7.35 X10(3)/MCL (ref 4.5–11.5)
WBC #/AREA URNS AUTO: ABNORMAL /HPF

## 2025-08-24 PROCEDURE — 93005 ELECTROCARDIOGRAM TRACING: CPT

## 2025-08-24 PROCEDURE — 85025 COMPLETE CBC W/AUTO DIFF WBC: CPT | Performed by: GENERAL PRACTICE

## 2025-08-24 PROCEDURE — 84443 ASSAY THYROID STIM HORMONE: CPT | Performed by: GENERAL PRACTICE

## 2025-08-24 PROCEDURE — 81003 URINALYSIS AUTO W/O SCOPE: CPT | Performed by: GENERAL PRACTICE

## 2025-08-24 PROCEDURE — 25000003 PHARM REV CODE 250: Performed by: GENERAL PRACTICE

## 2025-08-24 PROCEDURE — 84484 ASSAY OF TROPONIN QUANT: CPT | Performed by: GENERAL PRACTICE

## 2025-08-24 PROCEDURE — 80053 COMPREHEN METABOLIC PANEL: CPT | Performed by: GENERAL PRACTICE

## 2025-08-24 PROCEDURE — 80307 DRUG TEST PRSMV CHEM ANLYZR: CPT | Performed by: GENERAL PRACTICE

## 2025-08-24 PROCEDURE — 83880 ASSAY OF NATRIURETIC PEPTIDE: CPT | Performed by: GENERAL PRACTICE

## 2025-08-24 PROCEDURE — 81025 URINE PREGNANCY TEST: CPT | Performed by: GENERAL PRACTICE

## 2025-08-24 PROCEDURE — 82550 ASSAY OF CK (CPK): CPT | Performed by: GENERAL PRACTICE

## 2025-08-24 PROCEDURE — 99285 EMERGENCY DEPT VISIT HI MDM: CPT | Mod: 25

## 2025-08-24 RX ORDER — DOCUSATE SODIUM 100 MG/1
100 CAPSULE, LIQUID FILLED ORAL 2 TIMES DAILY
Qty: 60 CAPSULE | Refills: 0 | Status: SHIPPED | OUTPATIENT
Start: 2025-08-24

## 2025-08-24 RX ORDER — DOCUSATE SODIUM 100 MG/1
100 CAPSULE, LIQUID FILLED ORAL
Status: COMPLETED | OUTPATIENT
Start: 2025-08-24 | End: 2025-08-24

## 2025-08-24 RX ORDER — SULFAMETHOXAZOLE AND TRIMETHOPRIM 800; 160 MG/1; MG/1
1 TABLET ORAL
Status: COMPLETED | OUTPATIENT
Start: 2025-08-24 | End: 2025-08-24

## 2025-08-24 RX ORDER — SULFAMETHOXAZOLE AND TRIMETHOPRIM 800; 160 MG/1; MG/1
1 TABLET ORAL 2 TIMES DAILY
Qty: 14 TABLET | Refills: 0 | Status: SHIPPED | OUTPATIENT
Start: 2025-08-24 | End: 2025-08-31

## 2025-08-24 RX ADMIN — DOCUSATE SODIUM 100 MG: 100 CAPSULE, LIQUID FILLED ORAL at 05:08

## 2025-08-24 RX ADMIN — SULFAMETHOXAZOLE AND TRIMETHOPRIM 1 TABLET: 800; 160 TABLET ORAL at 05:08

## 2025-08-25 ENCOUNTER — PATIENT MESSAGE (OUTPATIENT)
Dept: FAMILY MEDICINE | Facility: CLINIC | Age: 30
End: 2025-08-25
Payer: MEDICAID